# Patient Record
Sex: FEMALE | Race: WHITE | Employment: UNEMPLOYED | URBAN - METROPOLITAN AREA
[De-identification: names, ages, dates, MRNs, and addresses within clinical notes are randomized per-mention and may not be internally consistent; named-entity substitution may affect disease eponyms.]

---

## 2017-06-26 LAB
HBSAG, EXTERNAL: NEGATIVE
HIV, EXTERNAL: NEGATIVE
RUBELLA, EXTERNAL: NORMAL
T. PALLIDUM, EXTERNAL: NEGATIVE
TYPE, ABO & RH, EXTERNAL: NORMAL

## 2017-12-20 LAB — GRBS, EXTERNAL: POSITIVE

## 2018-01-09 ENCOUNTER — HOSPITAL ENCOUNTER (INPATIENT)
Age: 43
LOS: 3 days | Discharge: HOME | End: 2018-01-12
Attending: OBSTETRICS & GYNECOLOGY | Admitting: OBSTETRICS & GYNECOLOGY
Payer: COMMERCIAL

## 2018-01-09 ENCOUNTER — ANESTHESIA EVENT (OUTPATIENT)
Dept: LABOR AND DELIVERY | Age: 43
End: 2018-01-09
Payer: COMMERCIAL

## 2018-01-09 ENCOUNTER — ANESTHESIA (OUTPATIENT)
Dept: LABOR AND DELIVERY | Age: 43
End: 2018-01-09
Payer: COMMERCIAL

## 2018-01-09 PROBLEM — Z34.90 PREGNANCY: Status: ACTIVE | Noted: 2018-01-09

## 2018-01-09 PROBLEM — O46.93 VAGINAL BLEEDING IN PREGNANCY, THIRD TRIMESTER: Status: ACTIVE | Noted: 2018-01-09

## 2018-01-09 LAB
ABO + RH BLD: NORMAL
ALBUMIN SERPL-MCNC: 2.5 G/DL (ref 3.5–5)
ALBUMIN/GLOB SERPL: 0.6 {RATIO} (ref 1.1–2.2)
ALP SERPL-CCNC: 131 U/L (ref 45–117)
ALT SERPL-CCNC: 25 U/L (ref 12–78)
ANION GAP SERPL CALC-SCNC: 7 MMOL/L (ref 5–15)
AST SERPL-CCNC: 16 U/L (ref 15–37)
BILIRUB SERPL-MCNC: 0.4 MG/DL (ref 0.2–1)
BLOOD GROUP ANTIBODIES SERPL: NORMAL
BUN SERPL-MCNC: 9 MG/DL (ref 6–20)
BUN/CREAT SERPL: 24 (ref 12–20)
CALCIUM SERPL-MCNC: 8.8 MG/DL (ref 8.5–10.1)
CHLORIDE SERPL-SCNC: 106 MMOL/L (ref 97–108)
CO2 SERPL-SCNC: 25 MMOL/L (ref 21–32)
CREAT SERPL-MCNC: 0.38 MG/DL (ref 0.55–1.02)
ERYTHROCYTE [DISTWIDTH] IN BLOOD BY AUTOMATED COUNT: 14.1 % (ref 11.5–14.5)
GLOBULIN SER CALC-MCNC: 4 G/DL (ref 2–4)
GLUCOSE SERPL-MCNC: 84 MG/DL (ref 65–100)
HCT VFR BLD AUTO: 39.7 % (ref 35–47)
HGB BLD-MCNC: 13 G/DL (ref 11.5–16)
MCH RBC QN AUTO: 28.5 PG (ref 26–34)
MCHC RBC AUTO-ENTMCNC: 32.7 G/DL (ref 30–36.5)
MCV RBC AUTO: 87.1 FL (ref 80–99)
PLATELET # BLD AUTO: 231 K/UL (ref 150–400)
POTASSIUM SERPL-SCNC: 3.9 MMOL/L (ref 3.5–5.1)
PROT SERPL-MCNC: 6.5 G/DL (ref 6.4–8.2)
RBC # BLD AUTO: 4.56 M/UL (ref 3.8–5.2)
SODIUM SERPL-SCNC: 138 MMOL/L (ref 136–145)
SPECIMEN EXP DATE BLD: NORMAL
WBC # BLD AUTO: 15.1 K/UL (ref 3.6–11)

## 2018-01-09 PROCEDURE — 76060000078 HC EPIDURAL ANESTHESIA: Performed by: OBSTETRICS & GYNECOLOGY

## 2018-01-09 PROCEDURE — 65410000002 HC RM PRIVATE OB

## 2018-01-09 PROCEDURE — 85027 COMPLETE CBC AUTOMATED: CPT | Performed by: OBSTETRICS & GYNECOLOGY

## 2018-01-09 PROCEDURE — 75410000003 HC RECOV DEL/VAG/CSECN EA 0.5 HR: Performed by: OBSTETRICS & GYNECOLOGY

## 2018-01-09 PROCEDURE — 75410000002 HC LABOR FEE PER 1 HR

## 2018-01-09 PROCEDURE — 74011250636 HC RX REV CODE- 250/636: Performed by: OBSTETRICS & GYNECOLOGY

## 2018-01-09 PROCEDURE — 77030007866 HC KT SPN ANES BBMI -B: Performed by: ANESTHESIOLOGY

## 2018-01-09 PROCEDURE — 74011250636 HC RX REV CODE- 250/636

## 2018-01-09 PROCEDURE — 77030003666 HC NDL SPINAL BD -A: Performed by: ANESTHESIOLOGY

## 2018-01-09 PROCEDURE — 0UB00ZZ EXCISION OF RIGHT OVARY, OPEN APPROACH: ICD-10-PCS | Performed by: OBSTETRICS & GYNECOLOGY

## 2018-01-09 PROCEDURE — 77030034849

## 2018-01-09 PROCEDURE — 74011250636 HC RX REV CODE- 250/636: Performed by: ANESTHESIOLOGY

## 2018-01-09 PROCEDURE — 77030027138 HC INCENT SPIROMETER -A

## 2018-01-09 PROCEDURE — 76010000391 HC C SECN FIRST 1 HR: Performed by: OBSTETRICS & GYNECOLOGY

## 2018-01-09 PROCEDURE — 76010000392 HC C SECN EA ADDL 0.5 HR: Performed by: OBSTETRICS & GYNECOLOGY

## 2018-01-09 PROCEDURE — 74011000250 HC RX REV CODE- 250

## 2018-01-09 PROCEDURE — 80053 COMPREHEN METABOLIC PANEL: CPT | Performed by: OBSTETRICS & GYNECOLOGY

## 2018-01-09 PROCEDURE — 36415 COLL VENOUS BLD VENIPUNCTURE: CPT | Performed by: OBSTETRICS & GYNECOLOGY

## 2018-01-09 PROCEDURE — 86900 BLOOD TYPING SEROLOGIC ABO: CPT | Performed by: OBSTETRICS & GYNECOLOGY

## 2018-01-09 PROCEDURE — 77030012890

## 2018-01-09 PROCEDURE — 74011250637 HC RX REV CODE- 250/637: Performed by: OBSTETRICS & GYNECOLOGY

## 2018-01-09 RX ORDER — NALBUPHINE HYDROCHLORIDE 10 MG/ML
2.5 INJECTION, SOLUTION INTRAMUSCULAR; INTRAVENOUS; SUBCUTANEOUS
Status: ACTIVE | OUTPATIENT
Start: 2018-01-09 | End: 2018-01-10

## 2018-01-09 RX ORDER — SODIUM CHLORIDE 0.9 % (FLUSH) 0.9 %
5-10 SYRINGE (ML) INJECTION EVERY 8 HOURS
Status: DISCONTINUED | OUTPATIENT
Start: 2018-01-09 | End: 2018-01-12 | Stop reason: HOSPADM

## 2018-01-09 RX ORDER — ACETAMINOPHEN 325 MG/1
650 TABLET ORAL
Status: DISCONTINUED | OUTPATIENT
Start: 2018-01-09 | End: 2018-01-12 | Stop reason: HOSPADM

## 2018-01-09 RX ORDER — HYDROCORTISONE 1 %
CREAM (GRAM) TOPICAL AS NEEDED
Status: DISCONTINUED | OUTPATIENT
Start: 2018-01-09 | End: 2018-01-12 | Stop reason: HOSPADM

## 2018-01-09 RX ORDER — SODIUM CHLORIDE 0.9 % (FLUSH) 0.9 %
5-10 SYRINGE (ML) INJECTION AS NEEDED
Status: DISCONTINUED | OUTPATIENT
Start: 2018-01-09 | End: 2018-01-12 | Stop reason: HOSPADM

## 2018-01-09 RX ORDER — KETOROLAC TROMETHAMINE 30 MG/ML
30 INJECTION, SOLUTION INTRAMUSCULAR; INTRAVENOUS
Status: DISCONTINUED | OUTPATIENT
Start: 2018-01-09 | End: 2018-01-12 | Stop reason: HOSPADM

## 2018-01-09 RX ORDER — OXYCODONE AND ACETAMINOPHEN 5; 325 MG/1; MG/1
1 TABLET ORAL
Status: DISCONTINUED | OUTPATIENT
Start: 2018-01-09 | End: 2018-01-12 | Stop reason: HOSPADM

## 2018-01-09 RX ORDER — ONDANSETRON 2 MG/ML
4 INJECTION INTRAMUSCULAR; INTRAVENOUS
Status: DISPENSED | OUTPATIENT
Start: 2018-01-09 | End: 2018-01-10

## 2018-01-09 RX ORDER — SODIUM CHLORIDE, SODIUM LACTATE, POTASSIUM CHLORIDE, CALCIUM CHLORIDE 600; 310; 30; 20 MG/100ML; MG/100ML; MG/100ML; MG/100ML
125 INJECTION, SOLUTION INTRAVENOUS CONTINUOUS
Status: DISCONTINUED | OUTPATIENT
Start: 2018-01-09 | End: 2018-01-09 | Stop reason: HOSPADM

## 2018-01-09 RX ORDER — AMMONIA 15 % (W/V)
1 AMPUL (EA) INHALATION AS NEEDED
Status: DISCONTINUED | OUTPATIENT
Start: 2018-01-09 | End: 2018-01-12 | Stop reason: HOSPADM

## 2018-01-09 RX ORDER — MORPHINE SULFATE 10 MG/ML
10 INJECTION, SOLUTION INTRAMUSCULAR; INTRAVENOUS
Status: ACTIVE | OUTPATIENT
Start: 2018-01-09 | End: 2018-01-10

## 2018-01-09 RX ORDER — NALOXONE HYDROCHLORIDE 0.4 MG/ML
0.4 INJECTION, SOLUTION INTRAMUSCULAR; INTRAVENOUS; SUBCUTANEOUS AS NEEDED
Status: DISCONTINUED | OUTPATIENT
Start: 2018-01-09 | End: 2018-01-12 | Stop reason: HOSPADM

## 2018-01-09 RX ORDER — ACETAMINOPHEN 10 MG/ML
1000 INJECTION, SOLUTION INTRAVENOUS ONCE
Status: COMPLETED | OUTPATIENT
Start: 2018-01-09 | End: 2018-01-09

## 2018-01-09 RX ORDER — OXYTOCIN/RINGER'S LACTATE 20/1000 ML
125-1000 PLASTIC BAG, INJECTION (ML) INTRAVENOUS
Status: DISCONTINUED | OUTPATIENT
Start: 2018-01-09 | End: 2018-01-12 | Stop reason: HOSPADM

## 2018-01-09 RX ORDER — MISOPROSTOL 100 UG/1
TABLET ORAL AS NEEDED
Status: DISCONTINUED | OUTPATIENT
Start: 2018-01-09 | End: 2018-01-12 | Stop reason: HOSPADM

## 2018-01-09 RX ORDER — MORPHINE SULFATE 1 MG/ML
INJECTION, SOLUTION EPIDURAL; INTRATHECAL; INTRAVENOUS AS NEEDED
Status: DISCONTINUED | OUTPATIENT
Start: 2018-01-09 | End: 2018-01-09 | Stop reason: HOSPADM

## 2018-01-09 RX ORDER — OXYTOCIN/RINGER'S LACTATE 20/1000 ML
PLASTIC BAG, INJECTION (ML) INTRAVENOUS
Status: DISCONTINUED | OUTPATIENT
Start: 2018-01-09 | End: 2018-01-09 | Stop reason: HOSPADM

## 2018-01-09 RX ORDER — DOCUSATE SODIUM 100 MG/1
100 CAPSULE, LIQUID FILLED ORAL
Status: DISCONTINUED | OUTPATIENT
Start: 2018-01-09 | End: 2018-01-12 | Stop reason: HOSPADM

## 2018-01-09 RX ORDER — SODIUM CHLORIDE, SODIUM LACTATE, POTASSIUM CHLORIDE, CALCIUM CHLORIDE 600; 310; 30; 20 MG/100ML; MG/100ML; MG/100ML; MG/100ML
125 INJECTION, SOLUTION INTRAVENOUS CONTINUOUS
Status: DISCONTINUED | OUTPATIENT
Start: 2018-01-09 | End: 2018-01-12 | Stop reason: HOSPADM

## 2018-01-09 RX ORDER — ONDANSETRON 2 MG/ML
INJECTION INTRAMUSCULAR; INTRAVENOUS AS NEEDED
Status: DISCONTINUED | OUTPATIENT
Start: 2018-01-09 | End: 2018-01-09 | Stop reason: HOSPADM

## 2018-01-09 RX ORDER — SIMETHICONE 80 MG
80 TABLET,CHEWABLE ORAL
Status: DISCONTINUED | OUTPATIENT
Start: 2018-01-09 | End: 2018-01-12 | Stop reason: HOSPADM

## 2018-01-09 RX ORDER — CEFAZOLIN SODIUM/WATER 2 G/20 ML
2 SYRINGE (ML) INTRAVENOUS ONCE
Status: COMPLETED | OUTPATIENT
Start: 2018-01-09 | End: 2018-01-09

## 2018-01-09 RX ORDER — KETOROLAC TROMETHAMINE 30 MG/ML
30 INJECTION, SOLUTION INTRAMUSCULAR; INTRAVENOUS
Status: DISCONTINUED | OUTPATIENT
Start: 2018-01-09 | End: 2018-01-09

## 2018-01-09 RX ORDER — OXYCODONE AND ACETAMINOPHEN 5; 325 MG/1; MG/1
2 TABLET ORAL
Status: DISCONTINUED | OUTPATIENT
Start: 2018-01-09 | End: 2018-01-12 | Stop reason: HOSPADM

## 2018-01-09 RX ORDER — MORPHINE SULFATE 10 MG/ML
6 INJECTION, SOLUTION INTRAMUSCULAR; INTRAVENOUS
Status: ACTIVE | OUTPATIENT
Start: 2018-01-09 | End: 2018-01-10

## 2018-01-09 RX ORDER — EPHEDRINE SULFATE 50 MG/ML
INJECTION, SOLUTION INTRAVENOUS AS NEEDED
Status: DISCONTINUED | OUTPATIENT
Start: 2018-01-09 | End: 2018-01-09 | Stop reason: HOSPADM

## 2018-01-09 RX ORDER — DIPHENHYDRAMINE HCL 25 MG
50 CAPSULE ORAL
Status: DISCONTINUED | OUTPATIENT
Start: 2018-01-09 | End: 2018-01-12 | Stop reason: HOSPADM

## 2018-01-09 RX ORDER — ZOLPIDEM TARTRATE 5 MG/1
5 TABLET ORAL
Status: DISCONTINUED | OUTPATIENT
Start: 2018-01-09 | End: 2018-01-12 | Stop reason: HOSPADM

## 2018-01-09 RX ORDER — ONDANSETRON 4 MG/1
4 TABLET, ORALLY DISINTEGRATING ORAL
Status: DISCONTINUED | OUTPATIENT
Start: 2018-01-09 | End: 2018-01-12 | Stop reason: HOSPADM

## 2018-01-09 RX ORDER — OXYTOCIN/RINGER'S LACTATE 20/1000 ML
125-1000 PLASTIC BAG, INJECTION (ML) INTRAVENOUS AS NEEDED
Status: DISCONTINUED | OUTPATIENT
Start: 2018-01-09 | End: 2018-01-12 | Stop reason: HOSPADM

## 2018-01-09 RX ORDER — IBUPROFEN 400 MG/1
800 TABLET ORAL EVERY 8 HOURS
Status: DISCONTINUED | OUTPATIENT
Start: 2018-01-09 | End: 2018-01-12 | Stop reason: HOSPADM

## 2018-01-09 RX ORDER — BUPIVACAINE HYDROCHLORIDE 7.5 MG/ML
INJECTION, SOLUTION EPIDURAL; RETROBULBAR AS NEEDED
Status: DISCONTINUED | OUTPATIENT
Start: 2018-01-09 | End: 2018-01-09 | Stop reason: HOSPADM

## 2018-01-09 RX ORDER — OXYTOCIN/RINGER'S LACTATE 20/1000 ML
PLASTIC BAG, INJECTION (ML) INTRAVENOUS
Status: COMPLETED
Start: 2018-01-09 | End: 2018-01-09

## 2018-01-09 RX ADMIN — SODIUM CHLORIDE, SODIUM LACTATE, POTASSIUM CHLORIDE, AND CALCIUM CHLORIDE 125 ML/HR: 600; 310; 30; 20 INJECTION, SOLUTION INTRAVENOUS at 09:41

## 2018-01-09 RX ADMIN — EPHEDRINE SULFATE 5 MG: 50 INJECTION, SOLUTION INTRAVENOUS at 14:14

## 2018-01-09 RX ADMIN — Medication: at 14:30

## 2018-01-09 RX ADMIN — MORPHINE SULFATE 200 MCG: 1 INJECTION, SOLUTION EPIDURAL; INTRATHECAL; INTRAVENOUS at 14:06

## 2018-01-09 RX ADMIN — EPHEDRINE SULFATE 5 MG: 50 INJECTION, SOLUTION INTRAVENOUS at 14:21

## 2018-01-09 RX ADMIN — EPHEDRINE SULFATE 5 MG: 50 INJECTION, SOLUTION INTRAVENOUS at 14:17

## 2018-01-09 RX ADMIN — ONDANSETRON 4 MG: 2 INJECTION INTRAMUSCULAR; INTRAVENOUS at 14:14

## 2018-01-09 RX ADMIN — Medication 2 G: at 14:00

## 2018-01-09 RX ADMIN — SODIUM CHLORIDE, SODIUM LACTATE, POTASSIUM CHLORIDE, AND CALCIUM CHLORIDE 125 ML/HR: 600; 310; 30; 20 INJECTION, SOLUTION INTRAVENOUS at 15:21

## 2018-01-09 RX ADMIN — SODIUM CHLORIDE, SODIUM LACTATE, POTASSIUM CHLORIDE, AND CALCIUM CHLORIDE: 600; 310; 30; 20 INJECTION, SOLUTION INTRAVENOUS at 14:21

## 2018-01-09 RX ADMIN — EPHEDRINE SULFATE 25 MG: 50 INJECTION, SOLUTION INTRAVENOUS at 14:13

## 2018-01-09 RX ADMIN — BUPIVACAINE HYDROCHLORIDE 12 MG: 7.5 INJECTION, SOLUTION EPIDURAL; RETROBULBAR at 14:06

## 2018-01-09 RX ADMIN — ACETAMINOPHEN 1000 MG: 10 INJECTION, SOLUTION INTRAVENOUS at 15:55

## 2018-01-09 NOTE — OP NOTES
Operative Note    Name: Halima Morton   Medical Record Number: [id number]   YOB: 1975  Today's Date: 2018      Pre-operative Diagnosis: REPEAT                                               Early labor, with excessive bleeding                                              3cm right ovarian endometrioma    Post-operative Diagnosis: same but delivered    Operation: Low Cervical Transverse Procedure(s):   SECTION  Right ovarian cystectomy    Surgeon(s):  Cintia Zamarripa MD    Anesthesia: Spinal    Prophylactic Antibiotics: Ancef  DVT Prophylaxis: Sequential Compression Devices         Fetal Description: don     Birth Information:   Information for the patient's :  Florentin Gilliam [[id number]]   Delivery of a   Female [1] infant on 2018 at 2:28 PM. Apgars were 9 and 9. Umbilical Cord:     Umbilical Cord Events:     Placenta:  removal with  appearance. Amniotic Fluid Volume:        Amniotic Fluid Description:  Meconium        Umbilical Cord: 3 vessels present    Placenta:  spontaneous    Specimens: none           Complications:  Mild uterine atony, massage, IV pitocin, 600mgm cytotec intra-cavitary    Fluids: lactated ringers, pitocin to IVF post delivery    EBL: 800cc    Procedure Detail:      After proper patient identification and consent, the patient was taken to the operating room, where spinal anesthesia was administered and found to be adequate. Fang catheter had been placed using sterile technique. The patient was prepped and draped in the normal sterile fashion. The abdomen was entered using the Pfannenstiel technique. The peritoneum was entered sharply well superior to the bladder without any apparent injury. The bladder flap was created without difficulty. A low transverse uterine incision was made with the scalpel and extended with blunt finger dissection.  Amniotomy was performed and the fluid was medium amount thin meconium. The babys head was then delivered atraumatically. The nose and mouth were suctioned. The cord was clamped and cut and the baby was handed off to Nursing staff in attendance. Placenta was then removed from the uterus. The uterus was curettaged with a moist lap pad and cleared of all clots and debris. The uterine incision was closed with 1-0 monocryl in running locking fashion with good hemostasis assured. An imbricating 1-0 monocryl suture was placed. The posterior cul-de-sac was irrigated with warm normal saline. Good hemostasis was again reassured. The right ovarian endometrioma was excised down to the level of normal appearing ovarian tissue and irrigated with copious amounts of normal saline. The uterus was returned to the abdomen. The anterior pelvis was irrigated with warm normal saline and good hemostasis was again reassured throughout. The right ovary was hemostatic and wrapped in interseed. The parietal peritoneum was closed with 2-0 Vicryl suture. The pyramidalis muscles were reapproximated with 1 interrupted 2-0 Vicryl suture. The fascia was closed with 1-0 looped PDS in a running fashion. Good hemostasis was assured. The skin was closed with a 4-monocryl subcutaneous closure. The patient tolerated the procedure well. Sponge, lap, and needle counts were correct times three and the patient and baby were taken to recovery/postpartum room in stable condition.     Kenan Mendoza MD  January 9, 2018  3:21 PM

## 2018-01-09 NOTE — PROGRESS NOTES
1545 Bedside report received from MISHA Baez RN. History, allergies, medications, and plan of care reviewed. Patient resting in the bed. Complaints of some lower abdominal pain. See MAR. Will continue to monitor. 1725  TRANSFER - OUT REPORT:  Verbal report given to HALINA Henry RN (name) on Alicia Harris  being transferred to MIU (unit) for routine progression of care       Report consisted of patients Situation, Background, Assessment and   Recommendations(SBAR). Information from the following report(s) SBAR, Kardex, OR Summary, Procedure Summary, Intake/Output, MAR and Recent Results was reviewed with the receiving nurse. Lines:   Peripheral IV 01/09/18 Left Arm (Active)   Site Assessment Clean, dry, & intact 1/9/2018  9:41 AM   Phlebitis Assessment 0 1/9/2018  9:41 AM   Infiltration Assessment 0 1/9/2018  9:41 AM   Dressing Status Clean, dry, & intact 1/9/2018  9:41 AM   Dressing Type Transparent;Tape 1/9/2018  9:41 AM   Hub Color/Line Status Pink 1/9/2018  9:41 AM   Action Taken Blood drawn 1/9/2018  9:41 AM        Opportunity for questions and clarification was provided.       Patient transported with:   Registered Nurse

## 2018-01-09 NOTE — PROGRESS NOTES
TRANSFER - IN REPORT:    Verbal report received from SOHAN Morin RN(name) on Carole Sports  being received from L&D(unit) for routine progression of care      Report consisted of patients Situation, Background, Assessment and   Recommendations(SBAR). Information from the following report(s) SBAR was reviewed with the receiving nurse. Opportunity for questions and clarification was provided. Assessment completed upon patients arrival to unit and care assumed. 1940: Pt does not have toradol ordered, nurse spoke to Dr. Elaine Morris. Per MD you may reorder iv toradol.

## 2018-01-09 NOTE — H&P
EDC:2018  EGA: 38 weeks, 3 days      Primary Provider:  Carlitos Maynard MD      History of Present Illness:   , previous c/s for breech, presents with c/o pink dc this morning and moderate backpain since last night. fetus active, no SROM  PNC with Dr. Key Gordon  rh+, group b streptococcus  +  A1DM, good control, nl surv., nl growth      Patient's Prenatal Care with Doctor of Record Carlitos Maynard MD Notable For -    GBS positive RX in labor   lab screening  High risk pregnancy AMA multigravida, mat21 nl, FS/w ECHO___  Diabetes gestational diet controlled  Ovarian cyst pregnant 3cm, h/o endometrioma '09, f/u pp or at time of c/s  Advanced paternal age  [de-identified] LTCS, breech at term, xx 8-1, desires repeat          Impression & Recommendations:    Problem # 1:  High risk pregnancy AMA multigravida, mat21 nl, FS/w ECHO___ (ICD-V23.82) (KIW41-B01.523)  presents with vaginal spotting/ back pain. r/o labor. considering    monitor,  IV, STBB, glucose check    Problem # 2:  GBS positive RX in labor (JVW-094.06) (AVS45-U10.88)    Problem # 3:  Diabetes gestational diet controlled (EZF-068.63) (QYY93-Z11.410)  23%ile at 36wks, 2800gms    Problem # 4:  Ovarian cyst pregnant 3cm, h/o endometrioma '09, f/u pp or at time of c/s (MTR-080.94) (MMF23-S33.83)          Past Medical History:     Reviewed history from 12/10/2008 and no changes required:        neg.     Past Surgical History:     Reviewed history from 2017 and no changes required:        LTCS, Female  (2009) Funmilayo, breech, excision of right ovarian endometrioma     Family History Summary:      Reviewed history Last on 2017 and no changes required:2018      General Comments - FH:  Family history transferred to [address] [place] [address]        Social History:     Reviewed history from 12/10/2008 and no changes required:                Allergies      Medications Removed from Medication List        [facility] Follow-up Visit     Estimated weeks of        gestation:  38 3/7     Weight:  180.9     Fetal position:  vertex     Cx Dilation:  FT     Cx Effacement: 80%     Cx Station:  -2      Height:   64 inches  Weight:  180.9 pounds      Physical Exam     General           General appearance:  no acute distress    Psych           Mood:  no appearance of anxiety, depression, or agitation    Skin           Inspection:  no rashes, suspicious lesions, or ulcerations    Abdomen           Abdomen:  gravid    Pelvic Exam           EGBUS:  no lesions                  Dilation: : FT                  Effacement:  80%                  Station:  -2                  Presentation:  vertex                  Membranes:  intact            Impression & Recommendations:    Problem # 1:  High risk pregnancy AMA multigravida, mat21 nl, FS/w ECHO___ (ICD-V23.82) (QCI07-B47.523)  presents with vaginal spotting/ back pain. r/o labor.    considering    monitor,  IV, STBB, glucose check    Problem # 2:  GBS positive RX in labor (ANNI-388.73) (RBQ68-G46.45)    Problem # 3:  Diabetes gestational diet controlled (XDG-173.48) (QMU50-W25.410)  23%ile at 36wks, 2800gms    Problem # 4:  Ovarian cyst pregnant 3cm, h/o endometrioma '09, f/u pp or at time of c/s (HVQ-774.70) (QOL65-R57.83)      Medications (at conclusion of this visit)    10/03/2017 FREESTYLE LITE DEVICE (BLOOD GLUCOSE MONITORING SUPPL) use to check blood glucose levels 4 times daily  2017 FREESTYLE LANCETS (LANCETS) use to check blood glucose levels 4 times daily  2017 FREESTYLE LITE TEST IN VITRO STRIP (GLUCOSE BLOOD) use to check blood glucose levels 4 times daily  2017 PNV-DHA 27-0.6-0.4-300 MG ORAL CAPSULE (PRENAT W/O T-YT-[id number]-FA-DHA) 1 po daily          LABORATORY DATA   TEST DATE RESULT   Group B Strep culture 2017 Positive                                   (Group B Strep Culture Result Field)   Blood Type 2017 O (Blood Type Result Field)   Rh 06/26/2017 Positive                                   (Rh Result Field)   Rhogam Inj Given 07/12/2009 *   Tdap Vaccine Given 11/10/2017 Vacc. [address]   Antibody Screen 10/23/2017 Negative   Rubella  [facility] Reference Ranges On or After 3/10/14                  <0.90              Non-immune      0.90 - 0.99     Equivocal      >0.99              Immune    [facility] Reference Ranges  Before 3/10/14           <5                 Non-immune             5 - 9               Equivocal            >9                 Immune  [facility] Reference Ranges       < Or = 0.90       Negative             0.91-1.09          Equivocal            > Or = 1.10       Positive   06/26/2017     10.70     TPA (T Pallidum Antibodies) 10/23/2017 Negative   Serology (RPR) 07/12/2009 *   HBsAg 06/26/2017 Negative   HIV 06/26/2017 Non Reactive   Hemoglobin 10/23/2017 12.6   Hematocrit 10/23/2017 37.9   Platelets 60/79/0377 269 X10E3/UL   TSH 07/12/2009 *   Urine Culture 06/26/2017 Negative   GC DNA Probe 06/26/2017 Negative   Chlamydia DNA 06/26/2017 Negative   PAP 11/10/2017 NIL   Flu Vaccine Given 10/23/2017 Vacc.  [address]   HGBA1C 06/26/2017 5.0   HGB Electro 12/10/2008 Negative   T4, Free 07/12/2009 *   BG Fasting 07/12/2009 *   GTT 1H 50G 09/14/2017 143   GTT 1H 100G 07/12/2009 *   GTT 2H 100G 07/12/2009 *   GTT 3H 100G 07/12/2009 *   Glucose Plasma 07/12/2009 *   CF Accept or Decline 06/26/2017 declined   CF Screen Result     Nuchal Trans 06/26/2017 declined   AFP Only 08/07/2017 *Screen Negative*   Tetra 01/30/2009 AFPNEG   AFP Serum 07/12/2009 *   CVS 06/26/2017 declined   AFP Amniotic 07/12/2009 *   Amnio Karyo 06/26/2017 declined   FISH 07/12/2009 *   GC Culture 07/12/2009 *   Chlamydia Cult 07/12/2009 *   Ureaplasma     Mycoplasma     WBC 06/26/2017 14.8 X10E3/UL   RBC 06/26/2017 4.63 X10E6/UL   MCV 06/26/2017 88   MCH 06/26/2017 29.2   MCHC RBC 06/26/2017 33.0     ULTRASOUND DATA   TEST DATE RESULT   Estimated Fetal Weight 01/02/2018 2831.28448704^2832 g&grams                                     Weight % 01/02/2018 25^25% %&percent                                                GERALD 01/02/2018 9.16^9.2 cm&centimeters                    BPP 01/02/2018 8^8 [n/a]&Not applicable   Cervical Length (mm)             Electronically signed by Samy Allen MD on 01/09/2018 at 9:40 AM    ________________________________________________________________________

## 2018-01-09 NOTE — ANESTHESIA PREPROCEDURE EVALUATION
Anesthetic History   No history of anesthetic complications            Review of Systems / Medical History  Patient summary reviewed, nursing notes reviewed and pertinent labs reviewed    Pulmonary  Within defined limits                 Neuro/Psych   Within defined limits           Cardiovascular  Within defined limits                     GI/Hepatic/Renal  Within defined limits              Endo/Other    Diabetes    Obesity     Other Findings              Physical Exam    Airway  Mallampati: II  TM Distance: > 6 cm  Neck ROM: normal range of motion   Mouth opening: Normal     Cardiovascular  Regular rate and rhythm,  S1 and S2 normal,  no murmur, click, rub, or gallop             Dental    Dentition: Upper dentition intact and Lower dentition intact     Pulmonary  Breath sounds clear to auscultation               Abdominal  GI exam deferred       Other Findings            Anesthetic Plan    ASA: 2  Anesthesia type: spinal      Post-op pain plan if not by surgeon: intrathecal opiates      Anesthetic plan and risks discussed with: Patient

## 2018-01-09 NOTE — IP AVS SNAPSHOT
[address] 
[phone] Patient: Gerson Carlson MRN: [id number] MMO:6[birth year] A check black indicates which time of day the medication should be taken. My Medications START taking these medications Instructions Each Dose to Equal  
 Morning Noon Evening Bedtime  
 docusate sodium 100 mg capsule Commonly known as:  Eleonore Alfredo Your last dose was: Your next dose is: Take 1 Cap by mouth two (2) times daily as needed for Constipation. 100 mg  
    
   
   
   
  
 ibuprofen 600 mg tablet Commonly known as:  MOTRIN Your last dose was: Your next dose is: Take 1 Tab by mouth every six (6) hours as needed for Pain. 600 mg  
    
   
   
   
  
 oxyCODONE-acetaminophen 5-325 mg per tablet Commonly known as:  PERCOCET Your last dose was: Your next dose is: Take 1 Tab by mouth every four (4) hours as needed for Pain. Max Daily Amount: 6 Tabs. Indications: Pain 1 Tab CONTINUE taking these medications Instructions Each Dose to Equal  
 Morning Noon Evening Bedtime PRENATAL MULTI PO Your last dose was: Your next dose is: Take  by mouth. Where to Get Your Medications Information on where to get these meds will be given to you by the nurse or doctor. ! Ask your nurse or doctor about these medications  
  docusate sodium 100 mg capsule  
 ibuprofen 600 mg tablet  
 oxyCODONE-acetaminophen 5-325 mg per tablet

## 2018-01-09 NOTE — PROGRESS NOTES
4140 Patient arrived in L&d with complaints of light vaginal bleeding that started around 0600 this morning. Positive fetal movement, no leaking of fluid    0922 Dr Kaitlin Soria at bedside. SVE closed and MD noticed small amount of blood on glove. MD considering delivering patient via C/S today    0950 Dr Kaitlin Soria reviewed fhr strip and aware of minimal variability    1116 Dr Kaitlin Soria at nurses station and reviewed fhr strip    1140 Dr Kaitlin Soria at bedside and reviewed fhr strip. SVE 1cm. MD wants to monitor patient little longer and recheck cervix    1324 Dr Kaitlin Soria at bedside. SVE 1cm no change. MD called C/S, patient still having vaginal bleeding present on glove    1545 Bedside and Verbal shift change report given to SOHAN Vargas RN (oncoming nurse) by MISHA Campbell RN (offgoing nurse). Report included the following information SBAR, OR Summary, Procedure Summary, Intake/Output, MAR and Med Rec Status.

## 2018-01-09 NOTE — IP AVS SNAPSHOT
Summary of Care Report The Summary of Care report has been created to help improve care coordination. Users with access to [employer] or [address] (Web-based application) may access additional patient information including the Discharge Summary. If you are not currently a [address] user and need more information, please call the number listed below in the [place] [address] section and ask to be connected with Medical Records. Facility Information Name Address Phone [address] [phone] Patient Information Patient Name Sex  Nenita Harris ([id number]) Female 1975 Discharge Information Admitting Provider Service Area Unit  
 Susan Vazquez MD /  [facility] 3w Mother Infant / [phone] Discharge Provider Discharge Date/Time Discharge Disposition Destination (none) 2018 Morning (Pending) AHR (none) Patient Language Language ENGLISH [13] Hospital Problems as of 2018  Reviewed: 2018  3:21 PM by Susan Vazquez MD  
  
  
  
 Class Noted - Resolved Last Modified POA Active Problems Vaginal bleeding in pregnancy, third trimester  2018 - Present 2018 by Susan Vazquez MD Unknown Entered by Susan Vazquez MD  
  Pregnancy  2018 - Present 2018 by Susan Vazquez MD Unknown Entered by Susan Vazquez MD  
  Single delivery by   2018 - Present 2018 by Susan Vazquez MD Unknown Entered by Susan Vazquez MD  
  
Non-Hospital Problems as of 2018  Reviewed: 2018  3:21 PM by Susan Vazquez MD  
 None You are allergic to the following No active allergies Current Discharge Medication List  
  
START taking these medications Dose & Instructions Dispensing Information Comments docusate sodium 100 mg capsule Commonly known as:  Juancarlos Erm Dose:  100 mg Take 1 Cap by mouth two (2) times daily as needed for Constipation. Quantity:  60 Cap Refills:  1  
   
 ibuprofen 600 mg tablet Commonly known as:  MOTRIN Dose:  600 mg Take 1 Tab by mouth every six (6) hours as needed for Pain. Quantity:  40 Tab Refills:  0  
   
 oxyCODONE-acetaminophen 5-325 mg per tablet Commonly known as:  PERCOCET Dose:  1 Tab Take 1 Tab by mouth every four (4) hours as needed for Pain. Max Daily Amount: 6 Tabs. Indications: Pain Quantity:  40 Tab Refills:  0 CONTINUE these medications which have NOT CHANGED Dose & Instructions Dispensing Information Comments PRENATAL MULTI PO Take  by mouth. Refills:  0 Surgery Information ID Date/Time Status Primary Surgeon All Procedures Location [id number] 2018 Posted Susana Tinsley MD  SECTION [facility] L&D OR    
  SECTION:  [facility]  Follow-up Information Follow up With Details Comments Contact Info [facility] Call As needed with breastfeeding questions [address] [facility] [place] [facility]", [address] 
[phone] Susana Tinsley MD   [address] [address] [phone] Discharge Instructions  Section: What to Expect at [facility] Your Recovery A  section, or , is surgery to deliver your baby through a cut, called an incision, that the doctor makes in your lower belly and uterus. You may have some pain in your lower belly and need pain medicine for 1 to 2 weeks. You can expect some vaginal bleeding for several weeks. You will probably need about 6 weeks to fully recover. It is important to take it easy while the incision is healing. Avoid heavy lifting, strenuous activities, or exercises that strain the belly muscles while you are recovering.  Ask a family member or friend for help with housework, cooking, and shopping. This care sheet gives you a general idea about how long it will take for you to recover. But each person recovers at a different pace. Follow the steps below to get better as quickly as possible. How can you care for yourself at home? Activity ? · Rest when you feel tired. Getting enough sleep will help you recover. ? · Try to walk each day. Start by walking a little more than you did the day before. Bit by bit, increase the amount you walk. Walking boosts blood flow and helps prevent pneumonia, constipation, and blood clots. ? · Avoid strenuous activities, such as bicycle riding, jogging, weightlifting, and aerobic exercise, for 6 weeks or until your doctor says it is okay. ? · Until your doctor says it is okay, do not lift anything heavier than your baby. ? · Do not do sit-ups or other exercises that strain the belly muscles for 6 weeks or until your doctor says it is okay. ? · Hold a pillow over your incision when you cough or take deep breaths. This will support your belly and decrease your pain. ? · You may shower as usual. Pat the incision dry when you are done. ? · You will have some vaginal bleeding. Wear sanitary pads. Do not douche or use tampons until your doctor says it is okay. ? · Ask your doctor when you can drive again. ? · You will probably need to take at least 6 weeks off work. It depends on the type of work you do and how you feel. ? · Ask your doctor when it is okay for you to have sex. Diet ? · You can eat your normal diet. If your stomach is upset, try bland, low-fat foods like plain rice, broiled chicken, toast, and yogurt. ? · Drink plenty of fluids (unless your doctor tells you not to). ? · You may notice that your bowel movements are not regular right after your surgery. This is common. Try to avoid constipation and straining with bowel movements. You may want to take a fiber supplement every day.  If you have not had a bowel movement after a couple of days, ask your doctor about taking a mild laxative. ? · If you are breastfeeding, do not drink any alcohol. Medicines ? · Your doctor will tell you if and when you can restart your medicines. He or she will also give you instructions about taking any new medicines. ? · If you take blood thinners, such as warfarin (Coumadin), clopidogrel (Plavix), or aspirin, be sure to talk to your doctor. He or she will tell you if and when to start taking those medicines again. Make sure that you understand exactly what your doctor wants you to do. ? · Take pain medicines exactly as directed. ¨ If the doctor gave you a prescription medicine for pain, take it as prescribed. ¨ If you are not taking a prescription pain medicine, ask your doctor if you can take an over-the-counter medicine. ? · If you think your pain medicine is making you sick to your stomach: 
¨ Take your medicine after meals (unless your doctor has told you not to). ¨ Ask your doctor for a different pain medicine. ? · If your doctor prescribed antibiotics, take them as directed. Do not stop taking them just because you feel better. You need to take the full course of antibiotics. Incision care ? · If you have strips of tape on the incision, leave the tape on for a week or until it falls off.  
? · Wash the area daily with warm, soapy water, and pat it dry. Don't use hydrogen peroxide or alcohol, which can slow healing. You may cover the area with a gauze bandage if it weeps or rubs against clothing. Change the bandage every day. ? · Keep the area clean and dry. Other instructions ? · If you breastfeed your baby, you may be more comfortable while you are healing if you place the baby so that he or she is not resting on your belly. Try tucking your baby under your arm, with his or her body along the side you will be feeding on.  Support your baby's upper body with your arm. With that hand you can control your baby's head to bring his or her mouth to your breast. This is sometimes called the football hold. Follow-up care is a key part of your treatment and safety. Be sure to make and go to all appointments, and call your doctor if you are having problems. It's also a good idea to know your test results and keep a list of the medicines you take. When should you call for help? Call 911 anytime you think you may need emergency care. For example, call if: 
? · You passed out (lost consciousness). ? · You have chest pain, are short of breath, or cough up blood. ?Call your doctor now or seek immediate medical care if: 
? · You have pain that does not get better after you take pain medicine. ? · You have severe vaginal bleeding. ? · You are dizzy or lightheaded, or you feel like you may faint. ? · You have new or worse pain in your belly or pelvis. ? · You have loose stitches, or your incision comes open. ? · You have symptoms of infection, such as: 
¨ Increased pain, swelling, warmth, or redness. ¨ Red streaks leading from the incision. ¨ Pus draining from the incision. ¨ A fever. ? · You have symptoms of a blood clot in your leg (called a deep vein thrombosis), such as: 
¨ Pain in your calf, back of the knee, thigh, or groin. ¨ Redness and swelling in your leg or groin. ? Watch closely for changes in your health, and be sure to contact your doctor if: 
? · You do not get better as expected. Where can you learn more? Go to http://tani-patt.info/. Enter M806 in the search box to learn more about \" Section: What to Expect at Home. \" Current as of: 2017 Content Version: 11.4 © 3209-7053 Healthwise, Incorporated. Care instructions adapted under license by [employer] (which disclaims liability or warranty for this information).  If you have questions about a medical condition or this instruction, always ask your healthcare professional. [address] [place] [address] any warranty or liability for your use of this information. Chart Review Routing History Recipient Method Report Sent By Corin Bhardwaj MD  
Phone: [phone] In Basket IP Auto Routed Notes Liban Mustafa MD [[id number]] 1/12/2018  8:11 AM 01/12/2018 Nathen Bhardwaj MD  
Phone: [phone] In Basket IP Auto Routed Notes Liban Mustafa MD [[id number]] 1/12/2018  8:12 AM 01/12/2018

## 2018-01-09 NOTE — IP AVS SNAPSHOT
[address] [facility] [address] [place] [address] 
[phone] Patient: Halima Morton MRN: [id number] About your hospitalization You were admitted on:  2018 You last received care in the:  [address] [facility] You were discharged on:  2018 Why you were hospitalized Your primary diagnosis was:  Not on File Your diagnoses also included:  Vaginal Bleeding In Pregnancy, Third Trimester, Pregnancy, Single Delivery By  Follow-up Information Follow up With Details Comments Contact Info [facility] Call As needed with breastfeeding questions [address], [address] 
[phone] Cintia Zamarripa MD   [address] [address] [phone] Your Scheduled Appointments 2018  9:00 AM EST  
L&D PAT with [facility] L&D PAT  
[facility] LD PAT SHADOW ([address]) [address]  
[phone] Discharge Orders None A check black indicates which time of day the medication should be taken. My Medications START taking these medications Instructions Each Dose to Equal  
 Morning Noon Evening Bedtime  
 docusate sodium 100 mg capsule Commonly known as:  Annita Gowers Your last dose was: Your next dose is: Take 1 Cap by mouth two (2) times daily as needed for Constipation. 100 mg  
    
   
   
   
  
 ibuprofen 600 mg tablet Commonly known as:  MOTRIN Your last dose was: Your next dose is: Take 1 Tab by mouth every six (6) hours as needed for Pain. 600 mg  
    
   
   
   
  
 oxyCODONE-acetaminophen 5-325 mg per tablet Commonly known as:  PERCOCET Your last dose was: Your next dose is: Take 1 Tab by mouth every four (4) hours as needed for Pain.  Max Daily Amount: 6 Tabs. Indications: Pain 1 Tab CONTINUE taking these medications Instructions Each Dose to Equal  
 Morning Noon Evening Bedtime PRENATAL MULTI PO Your last dose was: Your next dose is: Take  by mouth. Where to Get Your Medications Information on where to get these meds will be given to you by the nurse or doctor. ! Ask your nurse or doctor about these medications  
  docusate sodium 100 mg capsule  
 ibuprofen 600 mg tablet  
 oxyCODONE-acetaminophen 5-325 mg per tablet Discharge Instructions  Section: What to Expect at [facility] Your Recovery A  section, or , is surgery to deliver your baby through a cut, called an incision, that the doctor makes in your lower belly and uterus. You may have some pain in your lower belly and need pain medicine for 1 to 2 weeks. You can expect some vaginal bleeding for several weeks. You will probably need about 6 weeks to fully recover. It is important to take it easy while the incision is healing. Avoid heavy lifting, strenuous activities, or exercises that strain the belly muscles while you are recovering. Ask a family member or friend for help with housework, cooking, and shopping. This care sheet gives you a general idea about how long it will take for you to recover. But each person recovers at a different pace. Follow the steps below to get better as quickly as possible. How can you care for yourself at home? Activity ? · Rest when you feel tired. Getting enough sleep will help you recover. ? · Try to walk each day. Start by walking a little more than you did the day before. Bit by bit, increase the amount you walk. Walking boosts blood flow and helps prevent pneumonia, constipation, and blood clots.   
? · Avoid strenuous activities, such as bicycle riding, jogging, weightlifting, and aerobic exercise, for 6 weeks or until your doctor says it is okay. ? · Until your doctor says it is okay, do not lift anything heavier than your baby. ? · Do not do sit-ups or other exercises that strain the belly muscles for 6 weeks or until your doctor says it is okay. ? · Hold a pillow over your incision when you cough or take deep breaths. This will support your belly and decrease your pain. ? · You may shower as usual. Pat the incision dry when you are done. ? · You will have some vaginal bleeding. Wear sanitary pads. Do not douche or use tampons until your doctor says it is okay. ? · Ask your doctor when you can drive again. ? · You will probably need to take at least 6 weeks off work. It depends on the type of work you do and how you feel. ? · Ask your doctor when it is okay for you to have sex. Diet ? · You can eat your normal diet. If your stomach is upset, try bland, low-fat foods like plain rice, broiled chicken, toast, and yogurt. ? · Drink plenty of fluids (unless your doctor tells you not to). ? · You may notice that your bowel movements are not regular right after your surgery. This is common. Try to avoid constipation and straining with bowel movements. You may want to take a fiber supplement every day. If you have not had a bowel movement after a couple of days, ask your doctor about taking a mild laxative. ? · If you are breastfeeding, do not drink any alcohol. Medicines ? · Your doctor will tell you if and when you can restart your medicines. He or she will also give you instructions about taking any new medicines. ? · If you take blood thinners, such as warfarin (Coumadin), clopidogrel (Plavix), or aspirin, be sure to talk to your doctor. He or she will tell you if and when to start taking those medicines again. Make sure that you understand exactly what your doctor wants you to do. ? · Take pain medicines exactly as directed. ¨ If the doctor gave you a prescription medicine for pain, take it as prescribed. ¨ If you are not taking a prescription pain medicine, ask your doctor if you can take an over-the-counter medicine. ? · If you think your pain medicine is making you sick to your stomach: 
¨ Take your medicine after meals (unless your doctor has told you not to). ¨ Ask your doctor for a different pain medicine. ? · If your doctor prescribed antibiotics, take them as directed. Do not stop taking them just because you feel better. You need to take the full course of antibiotics. Incision care ? · If you have strips of tape on the incision, leave the tape on for a week or until it falls off.  
? · Wash the area daily with warm, soapy water, and pat it dry. Don't use hydrogen peroxide or alcohol, which can slow healing. You may cover the area with a gauze bandage if it weeps or rubs against clothing. Change the bandage every day. ? · Keep the area clean and dry. Other instructions ? · If you breastfeed your baby, you may be more comfortable while you are healing if you place the baby so that he or she is not resting on your belly. Try tucking your baby under your arm, with his or her body along the side you will be feeding on. Support your baby's upper body with your arm. With that hand you can control your baby's head to bring his or her mouth to your breast. This is sometimes called the football hold. Follow-up care is a key part of your treatment and safety. Be sure to make and go to all appointments, and call your doctor if you are having problems. It's also a good idea to know your test results and keep a list of the medicines you take. When should you call for help? Call 911 anytime you think you may need emergency care. For example, call if: 
? · You passed out (lost consciousness). ? · You have chest pain, are short of breath, or cough up blood. ?Call your doctor now or seek immediate medical care if: ? · You have pain that does not get better after you take pain medicine. ? · You have severe vaginal bleeding. ? · You are dizzy or lightheaded, or you feel like you may faint. ? · You have new or worse pain in your belly or pelvis. ? · You have loose stitches, or your incision comes open. ? · You have symptoms of infection, such as: 
¨ Increased pain, swelling, warmth, or redness. ¨ Red streaks leading from the incision. ¨ Pus draining from the incision. ¨ A fever. ? · You have symptoms of a blood clot in your leg (called a deep vein thrombosis), such as: 
¨ Pain in your calf, back of the knee, thigh, or groin. ¨ Redness and swelling in your leg or groin. ? Watch closely for changes in your health, and be sure to contact your doctor if: 
? · You do not get better as expected. Where can you learn more? Go to http://tani-patt.info/. Enter M806 in the search box to learn more about \" Section: What to Expect at Home. \" Current as of: 2017 Content Version: 11.4 © 8746-5357 [employer]. Care instructions adapted under license by [employer] (which disclaims liability or warranty for this information). If you have questions about a medical condition or this instruction, always ask your healthcare professional. [address] any warranty or liability for your use of this information. [employer] Announcement We are excited to announce that we are making your provider's discharge notes available to you in [employer]. You will see these notes when they are completed and signed by the physician that discharged you from your recent hospital stay. If you have any questions or concerns about any information you see in [employer], please call the Health Information Department where you were seen or reach out to your Primary Care Provider for more information about your plan of care. Introducing [facility]! Ekaterina Lisa introduces Vertigo patient portal. Now you can access parts of your medical record, email your doctor's office, and request medication refills online. 1. In your internet browser, go to [email or web address] 2. Click on the First Time User? Click Here link in the Sign In box. You will see the New Member Sign Up page. 3. Enter your Vertigo Access Code exactly as it appears below. You will not need to use this code after youve completed the sign-up process. If you do not sign up before the expiration date, you must request a new code. · Vertigo Access Code: [email or web address] Expires: 4/12/2018  2:47 PM 
 
4. Enter the last four digits of your Social Security Number (xxxx) and Date of Birth (mm/dd/yyyy) as indicated and click Submit. You will be taken to the next sign-up page. 5. Create a Vertigo ID. This will be your Vertigo login ID and cannot be changed, so think of one that is secure and easy to remember. 6. Create a Vertigo password. You can change your password at any time. 7. Enter your Password Reset Question and Answer. This can be used at a later time if you forget your password. 8. Enter your e-mail address. You will receive e-mail notification when new information is available in [address] 9. Click Sign Up. You can now view and download portions of your medical record. 10. Click the Download Summary menu link to download a portable copy of your medical information. If you have questions, please visit the Frequently Asked Questions section of the Vertigo website. Remember, Vertigo is NOT to be used for urgent needs. For medical emergencies, dial 911. Now available from your iPhone and Android! Providers Seen During Your Hospitalization Provider Specialty Primary office phone Isidro Graham MD Obstetrics & Gynecology [phone] Your Primary Care Physician (PCP) Primary Care Physician Office Phone Office Fax Gi Hogan [phone] You are allergic to the following No active allergies Recent Documentation Height Weight Breastfeeding? BMI OB Status Smoking Status 1.626 m 81.6 kg Unknown 30.9 kg/m2 Recent pregnancy Never Smoker Emergency Contacts Name Discharge Info Relation Home Work Mobile [address] CAREGIVER [3] Spouse [3] [phone] Patient Belongings The following personal items are in your possession at time of discharge: 
  Dental Appliances: None  Visual Aid: None      Home Medications: None   Jewelry: With patient  Clothing: With patient    Other Valuables: None Please provide this summary of care documentation to your next provider. Signatures-by signing, you are acknowledging that this After Visit Summary has been reviewed with you and you have received a copy. Patient Signature:  ____________________________________________________________ Date:  ____________________________________________________________  
  
[facility] Provider Signature:  ____________________________________________________________ Date:  ____________________________________________________________

## 2018-01-09 NOTE — PROGRESS NOTES
Labor Progress Note  Patient seen, fetal heart rate and contraction pattern evaluated, patient examined. Patient Vitals for the past 1 hrs:   BP Temp Pulse Resp   01/09/18 1252 125/64 97.8 °F (36.6 °C) 78 16       Physical Exam:  Cervical Exam:  70/1/-1/vtx/post  Membranes: intact  Uterine Activity: q3-5min  Fetal Heart Rate: Reactive    Assessment/Plan:  Active Problems:    Vaginal bleeding in pregnancy, third trimester (1/9/2018)      Pregnancy (1/9/2018)      Reassuring fetal status, cont. mild vaginal bleeding of questionable source with signs of early labor.  We'll proceed with delivery , pt desires repeat

## 2018-01-09 NOTE — ANESTHESIA POSTPROCEDURE EVALUATION
Post-Anesthesia Evaluation and Assessment    Patient: Lise Jara MRN: [id number]  SSN: [id number]    YOB: 1975  Age: 44 y.o. Sex: female       Cardiovascular Function/Vital Signs  Visit Vitals    /79    Pulse 87    Temp 36.4 °C (97.6 °F)    Resp 13    Ht 5' 4\" (1.626 m)    Wt 81.6 kg (180 lb)    SpO2 98%    Breastfeeding No    BMI 30.9 kg/m2       Patient is status post spinal anesthesia for Procedure(s):   SECTION. Nausea/Vomiting: None    Postoperative hydration reviewed and adequate. Pain:  Pain Scale 1: Labor Algorithm/Pain Intensity (18 1100)   Managed    Neurological Status: At baseline    Mental Status and Level of Consciousness: Arousable    Pulmonary Status:   O2 Device: Room air (18 1520)   Adequate oxygenation and airway patent    Complications related to anesthesia: None    Post-anesthesia assessment completed.  No concerns    Signed By: Alondra Pozo MD     2018

## 2018-01-10 LAB
BASOPHILS # BLD: 0 K/UL (ref 0–0.1)
BASOPHILS NFR BLD: 0 % (ref 0–1)
EOSINOPHIL # BLD: 0.1 K/UL (ref 0–0.4)
EOSINOPHIL NFR BLD: 1 % (ref 0–7)
ERYTHROCYTE [DISTWIDTH] IN BLOOD BY AUTOMATED COUNT: 14.1 % (ref 11.5–14.5)
HCT VFR BLD AUTO: 35.6 % (ref 35–47)
HGB BLD-MCNC: 11.5 G/DL (ref 11.5–16)
LYMPHOCYTES # BLD: 2.1 K/UL (ref 0.8–3.5)
LYMPHOCYTES NFR BLD: 12 % (ref 12–49)
MCH RBC QN AUTO: 28 PG (ref 26–34)
MCHC RBC AUTO-ENTMCNC: 32.3 G/DL (ref 30–36.5)
MCV RBC AUTO: 86.8 FL (ref 80–99)
MONOCYTES # BLD: 1.3 K/UL (ref 0–1)
MONOCYTES NFR BLD: 8 % (ref 5–13)
NEUTS SEG # BLD: 14.1 K/UL (ref 1.8–8)
NEUTS SEG NFR BLD: 79 % (ref 32–75)
PLATELET # BLD AUTO: 221 K/UL (ref 150–400)
RBC # BLD AUTO: 4.1 M/UL (ref 3.8–5.2)
WBC # BLD AUTO: 17.7 K/UL (ref 3.6–11)

## 2018-01-10 PROCEDURE — 74011250637 HC RX REV CODE- 250/637: Performed by: OBSTETRICS & GYNECOLOGY

## 2018-01-10 PROCEDURE — 65410000002 HC RM PRIVATE OB

## 2018-01-10 PROCEDURE — 36415 COLL VENOUS BLD VENIPUNCTURE: CPT | Performed by: OBSTETRICS & GYNECOLOGY

## 2018-01-10 PROCEDURE — 85025 COMPLETE CBC W/AUTO DIFF WBC: CPT | Performed by: OBSTETRICS & GYNECOLOGY

## 2018-01-10 RX ADMIN — ACETAMINOPHEN 650 MG: 325 TABLET, FILM COATED ORAL at 18:39

## 2018-01-10 RX ADMIN — ACETAMINOPHEN 650 MG: 325 TABLET, FILM COATED ORAL at 10:25

## 2018-01-10 NOTE — PROGRESS NOTES
Post-Operative  Day 1    Alexandria Mcgregor     Assessment: Post-Op day 1, stable    Plan:   1. Routine post-operative care       Information for the patient's :  Audrey Hunt [[id number]]   , Low Transverse   Patient doing well without significant complaint. Nausea and vomiting resolved, tolerating liquids, no flatus, caro in place. Vitals:  Visit Vitals    /63 (BP 1 Location: Right arm, BP Patient Position: At rest)    Pulse 94    Temp 98.6 °F (37 °C)    Resp 16    Ht 5' 4\" (1.626 m)    Wt 81.6 kg (180 lb)    SpO2 98%    Breastfeeding No    BMI 30.9 kg/m2     Temp (24hrs), Av.4 °F (36.9 °C), Min:97.6 °F (36.4 °C), Max:99 °F (37.2 °C)      Last 24hr Input/Output:    Intake/Output Summary (Last 24 hours) at 01/10/18 1158  Last data filed at 01/10/18 0200   Gross per 24 hour   Intake             2150 ml   Output             3300 ml   Net            -1150 ml          Exam:        Patient without distress. Lungs clear. Abdomen, bowel sounds present, soft, expected tenderness, fundus firm Wound dressing intact     Perineum normal lochia noted               Lower extremities are negative for swelling, cords or tenderness.     Labs:   Lab Results   Component Value Date/Time    WBC 17.7 01/10/2018 05:48 AM    WBC 15.1 2018 09:42 AM    WBC 15.4 2009 06:00 AM    WBC 10.7 2009 11:00 AM    HGB 11.5 01/10/2018 05:48 AM    HGB 13.0 2018 09:42 AM    HGB 11.2 2009 06:00 AM    HGB 13.1 2009 11:00 AM    HCT 35.6 01/10/2018 05:48 AM    HCT 39.7 2018 09:42 AM    HCT 34.3 2009 06:00 AM    HCT 39.7 2009 11:00 AM    PLATELET 852  05:48 AM    PLATELET 083  09:42 AM    PLATELET 201  06:00 AM    PLATELET 420  11:00 AM       Recent Results (from the past 24 hour(s))   CBC WITH AUTOMATED DIFF    Collection Time: 01/10/18  5:48 AM   Result Value Ref Range    WBC 17.7 (H) 3.6 - 11.0 K/uL RBC 4.10 3.80 - 5.20 M/uL    HGB 11.5 11.5 - 16.0 g/dL    HCT 35.6 35.0 - 47.0 %    MCV 86.8 80.0 - 99.0 FL    MCH 28.0 26.0 - 34.0 PG    MCHC 32.3 30.0 - 36.5 g/dL    RDW 14.1 11.5 - 14.5 %    PLATELET 425 003 - 343 K/uL    NEUTROPHILS 79 (H) 32 - 75 %    LYMPHOCYTES 12 12 - 49 %    MONOCYTES 8 5 - 13 %    EOSINOPHILS 1 0 - 7 %    BASOPHILS 0 0 - 1 %    ABS. NEUTROPHILS 14.1 (H) 1.8 - 8.0 K/UL    ABS. LYMPHOCYTES 2.1 0.8 - 3.5 K/UL    ABS. MONOCYTES 1.3 (H) 0.0 - 1.0 K/UL    ABS. EOSINOPHILS 0.1 0.0 - 0.4 K/UL    ABS.  BASOPHILS 0.0 0.0 - 0.1 K/UL

## 2018-01-10 NOTE — LACTATION NOTE
This note was copied from a baby's chart. Initial Lactation Consultation: Infant born yesterday to a  mom via C/S at 45 3/7 weeks gestation. Mom noticed breast changes during the pregnancy. She states she nursed her first child, but didn't feel like she had a good supply. I observed the infant at the breast, nursing in the football hold. Infant latched deeply, with rhythmic sucking and occasional swallowing. Demonstrated to mom how to perform breast massage and manual expression. I discussed the process of lactogenesis with mom and that the production of milk is dependent upon the stimulation provided to the breast and removal of the existing colostrum. Mom verbalizes understanding. I shared with mom that the recommendation is that she not give the infant formula or pacifiers until 1weeks of age. Discussed the impact of bottles/formula on milk supply. Skin to skin and rooming in discussed with mom. The benefits include infants temperature regulation, decrease stress in mom and baby, increase in maternal milk production and allowing mom to see early feeding cues. Feeding Plan: Mother will keep baby skin to skin as often as possible, feed on demand, 8-12x/day , respond to feeding cues, obtain latch, listen for audible swallowing, be aware of signs of oxytocin release/ cramping,thirst,sleepiness while breastfeeding, offer both breasts,and will not limit feedings. Mother agrees to utilize breast massage while nursing to facilitate lactogenesis. 200 Mom is requesting a bottle, stating that infant is not consolable. (Infant is currently sleeping in mother's lap). Mom is concerned that infant is not getting enough from her. I was able to express colostrum and showed it to mom. Infant latched deeply and nursed well on both breasts while I was in the room. Demonstrated to mom how to perform breast massage while infant is nursing. Occasional swallowing of infant could be heard.    Father of baby asks about pumping mom's breasts so that they can see how much milk is there. I explained to them that the infant is better at removing mom's milk/colostrum than by using a pump and that pumping will not provide an accurate assessment of mom's colostrum availability at this time.

## 2018-01-10 NOTE — ROUTINE PROCESS
Received OB SBAR Report from HALINA Jean RN    2000 pt nauseous, vomited small amount of clear fluid, will not remove caro at 6hr black (2030), will reevaluate in two hours

## 2018-01-10 NOTE — PROGRESS NOTES
Anesthesia Post Operative Day 1      Patient is s/p spinal / epidural DuraMorph for Cesearean Section. The patient relates mild pain, none itching and mild  nausea. All symptoms were treated with protocol meds with good results. Patient is up and ambulating without complaints. Plan: Continue Duramorph protocol as needed.

## 2018-01-11 PROCEDURE — 65410000002 HC RM PRIVATE OB

## 2018-01-11 PROCEDURE — 74011250637 HC RX REV CODE- 250/637: Performed by: OBSTETRICS & GYNECOLOGY

## 2018-01-11 RX ADMIN — IBUPROFEN 800 MG: 400 TABLET, FILM COATED ORAL at 17:37

## 2018-01-11 RX ADMIN — ACETAMINOPHEN 650 MG: 325 TABLET, FILM COATED ORAL at 00:08

## 2018-01-11 RX ADMIN — ACETAMINOPHEN 650 MG: 325 TABLET, FILM COATED ORAL at 05:49

## 2018-01-11 RX ADMIN — IBUPROFEN 800 MG: 400 TABLET, FILM COATED ORAL at 09:37

## 2018-01-11 NOTE — PROGRESS NOTES
Post-Operative  Day 2    Alexandria Mcgregor     Assessment: Post-Op day 2, doing well    Plan:   1. Routine post-operative care    Information for the patient's :  Geovani Oreilly [[id number]]   , Low Transverse   Patient doing well without significant complaint. Nausea and vomiting resolved, tolerating liquids, passing flatus, voiding and ambulating without difficulty. Vitals:  Visit Vitals    /72 (BP 1 Location: Right arm, BP Patient Position: At rest)    Pulse 91    Temp 98.2 °F (36.8 °C)    Resp 16    Ht 5' 4\" (1.626 m)    Wt 81.6 kg (180 lb)    SpO2 98%    Breastfeeding No    BMI 30.9 kg/m2     Temp (24hrs), Av.3 °F (36.8 °C), Min:98 °F (36.7 °C), Max:98.7 °F (37.1 °C)        Exam:        Patient without distress. Abdomen, bowel sounds present, soft, expected tenderness, fundus firm                Wound incision clean, dry and intact               Lower extremities are negative for swelling, cords or tenderness. Labs:   Lab Results   Component Value Date/Time    WBC 17.7 01/10/2018 05:48 AM    WBC 15.1 2018 09:42 AM    WBC 15.4 2009 06:00 AM    WBC 10.7 2009 11:00 AM    HGB 11.5 01/10/2018 05:48 AM    HGB 13.0 2018 09:42 AM    HGB 11.2 2009 06:00 AM    HGB 13.1 2009 11:00 AM    HCT 35.6 01/10/2018 05:48 AM    HCT 39.7 2018 09:42 AM    HCT 34.3 2009 06:00 AM    HCT 39.7 2009 11:00 AM    PLATELET 038  05:48 AM    PLATELET 570  09:42 AM    PLATELET 885  06:00 AM    PLATELET 512  11:00 AM       No results found for this or any previous visit (from the past 24 hour(s)).

## 2018-01-11 NOTE — ROUTINE PROCESS
Bedside shift change report given to Isaura Morris RN (oncoming nurse) by ALINA Wu RN (offgoing nurse). Report included the following information SBAR, Kardex, Procedure Summary, Intake/Output, MAR and Recent Results.

## 2018-01-11 NOTE — LACTATION NOTE
This note was copied from a baby's chart. Not seen at breast, mother declines [facility], expresses confidence in ability to breastfeed independently. Mother has been giving formula despite education about the risks to milk supply. She states that she is feeling better about breastfeeding today because she can feel that her breasts are heavier.

## 2018-01-12 VITALS
WEIGHT: 180 LBS | SYSTOLIC BLOOD PRESSURE: 97 MMHG | OXYGEN SATURATION: 98 % | RESPIRATION RATE: 16 BRPM | DIASTOLIC BLOOD PRESSURE: 60 MMHG | HEIGHT: 64 IN | BODY MASS INDEX: 30.73 KG/M2 | HEART RATE: 78 BPM | TEMPERATURE: 98 F

## 2018-01-12 PROCEDURE — 74011250637 HC RX REV CODE- 250/637: Performed by: OBSTETRICS & GYNECOLOGY

## 2018-01-12 RX ORDER — DOCUSATE SODIUM 100 MG/1
100 CAPSULE, LIQUID FILLED ORAL
Qty: 60 CAP | Refills: 1 | Status: SHIPPED | OUTPATIENT
Start: 2018-01-12 | End: 2019-05-29

## 2018-01-12 RX ORDER — IBUPROFEN 600 MG/1
600 TABLET ORAL
Qty: 40 TAB | Refills: 0 | Status: SHIPPED | OUTPATIENT
Start: 2018-01-12 | End: 2019-05-29

## 2018-01-12 RX ORDER — OXYCODONE AND ACETAMINOPHEN 5; 325 MG/1; MG/1
1 TABLET ORAL
Qty: 40 TAB | Refills: 0 | Status: SHIPPED | OUTPATIENT
Start: 2018-01-12 | End: 2019-05-29

## 2018-01-12 RX ADMIN — IBUPROFEN 800 MG: 400 TABLET, FILM COATED ORAL at 18:27

## 2018-01-12 RX ADMIN — IBUPROFEN 800 MG: 400 TABLET, FILM COATED ORAL at 10:42

## 2018-01-12 RX ADMIN — IBUPROFEN 800 MG: 400 TABLET, FILM COATED ORAL at 01:43

## 2018-01-12 NOTE — LACTATION NOTE
This note was copied from a baby's chart.     Couplet Interdisciplinary Rounds     MATERNAL    Daily Goal:     Influenza screening completed: YES   Tdap screening completed: YES   Rhogam Given:N/A  MMR Given:N/A    VTE Prophylaxis: Not indicated, per Provider order    EPDS:            Patient Name: Kavita Chowdhury Diagnosis: [place]  Single liveborn, born in hospital, delivered by  delivery   Date of Admission: 2018 LOS: 3  Gestational Age: Gestational Age: 38w3d       Daily Goal:     Birth Weight: 2.845 kg Current Weight: Weight: 2.515 kg (5 pounds 8.7 ounces)  % of Weight Change: -12%    Feeding:   Metabolic Screen: NO    Hepatitis B:  Patient refused    Discharge Bili:  YES  Car Seat Trial, if needed:  N/A      Patient/Family Teaching Needs:     Days before discharge: Ready for discharge    In Attendance:  Nursing

## 2018-01-12 NOTE — PROGRESS NOTES
.Bedside shift change report given to Naila Shah RN (oncoming nurse) by ALINA Baer RN (offgoing nurse). Report included the following information SBAR.

## 2018-01-12 NOTE — LACTATION NOTE
This note was copied from a baby's chart. Infant had an 11.5% weigh loss. Infant to be reweighed at 4:00pm today. Discharge pending weight. Per mom, baby nursing well today,  deep latch obtained, mother is comfortable, baby feeding vigorously with rhythmic suck, swallow, breathe pattern, audible swallowing, and evident milk transfer, both breasts offered, baby is asleep following feeding. Mom is double pumping for 15 minutes following breastfeeding the infant, then she is giving EBM and formula supplementation. Discussed engorgement management with mom. Opportunity for questions provided. Mom to call for assistance as needed.

## 2018-01-12 NOTE — PROGRESS NOTES
Post-Operative  Day 3    Alexandriaalexsander Mcgregor       Assessment: Post-Op day 3, doing well    Plan:   1. Discharge home today  2. Follow up in office in 6 weeks with Venkatesh Reyes MD  3. Post partum activity/wound care advised, diet as tolerated  4. Discharge Medications: ibuprofen, percocet and medications prior to admission      Information for the patient's :  Mary Bobby [[id number]]   , Low Transverse   Patient doing well without significant complaint. Tolerating diet, passing flatus, voiding and ambulating without difficulty    Vitals:  Visit Vitals    /80 (BP 1 Location: Right arm, BP Patient Position: At rest)    Pulse 75    Temp 97.7 °F (36.5 °C)    Resp 14    Ht 5' 4\" (1.626 m)    Wt 81.6 kg (180 lb)    SpO2 98%    Breastfeeding No    BMI 30.9 kg/m2     Temp (24hrs), Av °F (36.7 °C), Min:97.6 °F (36.4 °C), Max:98.5 °F (36.9 °C)        Exam:        Patient without distress. Abdomen, bowel sounds present, soft, expected tenderness, fundus firm                Wound incision clean, dry and intact               Lower extremities are negative for swelling, cords or tenderness. Labs:   Lab Results   Component Value Date/Time    WBC 17.7 01/10/2018 05:48 AM    WBC 15.1 2018 09:42 AM    WBC 15.4 2009 06:00 AM    WBC 10.7 2009 11:00 AM    HGB 11.5 01/10/2018 05:48 AM    HGB 13.0 2018 09:42 AM    HGB 11.2 2009 06:00 AM    HGB 13.1 2009 11:00 AM    HCT 35.6 01/10/2018 05:48 AM    HCT 39.7 2018 09:42 AM    HCT 34.3 2009 06:00 AM    HCT 39.7 2009 11:00 AM    PLATELET 005  05:48 AM    PLATELET 431  09:42 AM    PLATELET 696  06:00 AM    PLATELET 933  11:00 AM       No results found for this or any previous visit (from the past 24 hour(s)).

## 2018-01-12 NOTE — DISCHARGE SUMMARY
Obstetrical Discharge Summary     Name: Miri Nance MRN: [id number]  SSN: [id number]    YOB: 1975  Age: 44 y.o. Sex: female      Admit Date: 2018    Discharge Date: 2018     Admitting Physician: Dorian Madrid MD     Attending Physician:  Dorian Madrid MD     Admission Diagnoses: Maternity  Pregnancy  REPEAT   Vaginal bleeding    Discharge Diagnoses:   Information for the patient's :  Yu White [[id number]]   Delivery of a 2.845 kg female infant via , Low Transverse on 2018 at 2:28 PM  by . Apgars were 9 and 9. Additional Diagnoses:   Hospital Problems  Date Reviewed: 2018          Codes Class Noted POA    Vaginal bleeding in pregnancy, third trimester ICD-10-CM: O46.93  ICD-9-CM: 641.93  2018 Unknown        Pregnancy ICD-10-CM: Z34.90  ICD-9-CM: V22.2  2018 Unknown        Single delivery by  ICD-10-CM: O82  ICD-9-CM: 669.71  2018 Unknown             Lab Results   Component Value Date/Time    Rubella, External Immune 2017    GrBStrep, External Positive 2017       Hospital Course: Patient was admitted with vaginal bleeding and signs of early labor at 45 weeks 3 days. She was delivered by repeat  section and also underwent right ovarian cystectomy. Pathology pending at the time of discharge but operative note indicated a 3cm endometrioma. Normal hospital course following the delivery. Disposition at Discharge: Home or self care    Discharged Condition: Stable    Patient Instructions:   Current Discharge Medication List      START taking these medications    Details   ibuprofen (MOTRIN) 600 mg tablet Take 1 Tab by mouth every six (6) hours as needed for Pain. Qty: 40 Tab, Refills: 0      oxyCODONE-acetaminophen (PERCOCET) 5-325 mg per tablet Take 1 Tab by mouth every four (4) hours as needed for Pain. Max Daily Amount: 6 Tabs.  Indications: Pain  Qty: 40 Tab, Refills: 0    Associated Diagnoses: Single delivery by       docusate sodium (COLACE) 100 mg capsule Take 1 Cap by mouth two (2) times daily as needed for Constipation. Qty: 60 Cap, Refills: 1         CONTINUE these medications which have NOT CHANGED    Details   PRENATAL /IRON/FOLIC ACID (PRENATAL MULTI PO) Take  by mouth. Reference my discharge instructions.     Follow-up Appointments   Procedures    FOLLOW UP VISIT Appointment in: 6 Weeks     Standing Status:   Standing     Number of Occurrences:   1     Order Specific Question:   Appointment in     Answer:   6 Weeks        Signed By:  Luis Daniel Stanley MD     2018

## 2018-01-12 NOTE — DISCHARGE INSTRUCTIONS
Section: What to Expect at [address] [place] [address]    A  section, or , is surgery to deliver your baby through a cut, called an incision, that the doctor makes in your lower belly and uterus. You may have some pain in your lower belly and need pain medicine for 1 to 2 weeks. You can expect some vaginal bleeding for several weeks. You will probably need about 6 weeks to fully recover. It is important to take it easy while the incision is healing. Avoid heavy lifting, strenuous activities, or exercises that strain the belly muscles while you are recovering. Ask a family member or friend for help with housework, cooking, and shopping. This care sheet gives you a general idea about how long it will take for you to recover. But each person recovers at a different pace. Follow the steps below to get better as quickly as possible. How can you care for yourself at home? Activity  ? · Rest when you feel tired. Getting enough sleep will help you recover. ? · Try to walk each day. Start by walking a little more than you did the day before. Bit by bit, increase the amount you walk. Walking boosts blood flow and helps prevent pneumonia, constipation, and blood clots. ? · Avoid strenuous activities, such as bicycle riding, jogging, weightlifting, and aerobic exercise, for 6 weeks or until your doctor says it is okay. ? · Until your doctor says it is okay, do not lift anything heavier than your baby. ? · Do not do sit-ups or other exercises that strain the belly muscles for 6 weeks or until your doctor says it is okay. ? · Hold a pillow over your incision when you cough or take deep breaths. This will support your belly and decrease your pain. ? · You may shower as usual. Pat the incision dry when you are done. ? · You will have some vaginal bleeding. Wear sanitary pads. Do not douche or use tampons until your doctor says it is okay. ? · Ask your doctor when you can drive again. ? · You will probably need to take at least 6 weeks off work. It depends on the type of work you do and how you feel. ? · Ask your doctor when it is okay for you to have sex. Diet  ? · You can eat your normal diet. If your stomach is upset, try bland, low-fat foods like plain rice, broiled chicken, toast, and yogurt. ? · Drink plenty of fluids (unless your doctor tells you not to). ? · You may notice that your bowel movements are not regular right after your surgery. This is common. Try to avoid constipation and straining with bowel movements. You may want to take a fiber supplement every day. If you have not had a bowel movement after a couple of days, ask your doctor about taking a mild laxative. ? · If you are breastfeeding, do not drink any alcohol. Medicines  ? · Your doctor will tell you if and when you can restart your medicines. He or she will also give you instructions about taking any new medicines. ? · If you take blood thinners, such as warfarin (Coumadin), clopidogrel (Plavix), or aspirin, be sure to talk to your doctor. He or she will tell you if and when to start taking those medicines again. Make sure that you understand exactly what your doctor wants you to do. ? · Take pain medicines exactly as directed. ¨ If the doctor gave you a prescription medicine for pain, take it as prescribed. ¨ If you are not taking a prescription pain medicine, ask your doctor if you can take an over-the-counter medicine. ? · If you think your pain medicine is making you sick to your stomach:  ¨ Take your medicine after meals (unless your doctor has told you not to). ¨ Ask your doctor for a different pain medicine. ? · If your doctor prescribed antibiotics, take them as directed. Do not stop taking them just because you feel better. You need to take the full course of antibiotics. Incision care  ?  · If you have strips of tape on the incision, leave the tape on for a week or until it falls off.   ? · Wash the area daily with warm, soapy water, and pat it dry. Don't use hydrogen peroxide or alcohol, which can slow healing. You may cover the area with a gauze bandage if it weeps or rubs against clothing. Change the bandage every day. ? · Keep the area clean and dry. Other instructions  ? · If you breastfeed your baby, you may be more comfortable while you are healing if you place the baby so that he or she is not resting on your belly. Try tucking your baby under your arm, with his or her body along the side you will be feeding on. Support your baby's upper body with your arm. With that hand you can control your baby's head to bring his or her mouth to your breast. This is sometimes called the football hold. Follow-up care is a key part of your treatment and safety. Be sure to make and go to all appointments, and call your doctor if you are having problems. It's also a good idea to know your test results and keep a list of the medicines you take. When should you call for help? Call 911 anytime you think you may need emergency care. For example, call if:  ? · You passed out (lost consciousness). ? · You have chest pain, are short of breath, or cough up blood. ?Call your doctor now or seek immediate medical care if:  ? · You have pain that does not get better after you take pain medicine. ? · You have severe vaginal bleeding. ? · You are dizzy or lightheaded, or you feel like you may faint. ? · You have new or worse pain in your belly or pelvis. ? · You have loose stitches, or your incision comes open. ? · You have symptoms of infection, such as:  ¨ Increased pain, swelling, warmth, or redness. ¨ Red streaks leading from the incision. ¨ Pus draining from the incision. ¨ A fever. ? · You have symptoms of a blood clot in your leg (called a deep vein thrombosis), such as:  ¨ Pain in your calf, back of the knee, thigh, or groin. ¨ Redness and swelling in your leg or groin. ? Watch closely for changes in your health, and be sure to contact your doctor if:  ? · You do not get better as expected. Where can you learn more? Go to http://tani-patt.info/. Enter M806 in the search box to learn more about \" Section: What to Expect at Home. \"  Current as of: 2017  Content Version: 11.4  © 7015-4561 [employer]. Care instructions adapted under license by [employer] (which disclaims liability or warranty for this information). If you have questions about a medical condition or this instruction, always ask your healthcare professional. [place] [address] any warranty or liability for your use of this information.

## 2018-11-02 LAB
ANTIBODY SCREEN, EXTERNAL: NEGATIVE
HBSAG, EXTERNAL: NEGATIVE
HIV, EXTERNAL: NON REACTIVE
RUBELLA, EXTERNAL: NORMAL
T. PALLIDUM, EXTERNAL: NEGATIVE

## 2019-05-08 LAB — GRBS, EXTERNAL: NEGATIVE

## 2019-05-21 ENCOUNTER — ANESTHESIA EVENT (OUTPATIENT)
Dept: LABOR AND DELIVERY | Age: 44
End: 2019-05-21
Payer: COMMERCIAL

## 2019-05-24 ENCOUNTER — HOSPITAL ENCOUNTER (OUTPATIENT)
Dept: OTHER | Age: 44
Discharge: HOME | End: 2019-05-24
Payer: COMMERCIAL

## 2019-05-24 LAB
ABO + RH BLD: NORMAL
BLOOD GROUP ANTIBODIES SERPL: NORMAL
ERYTHROCYTE [DISTWIDTH] IN BLOOD BY AUTOMATED COUNT: 14.2 % (ref 11.5–14.5)
HCT VFR BLD AUTO: 39.7 % (ref 35–47)
HGB BLD-MCNC: 12.5 G/DL (ref 11.5–16)
MCH RBC QN AUTO: 28.4 PG (ref 26–34)
MCHC RBC AUTO-ENTMCNC: 31.5 G/DL (ref 30–36.5)
MCV RBC AUTO: 90.2 FL (ref 80–99)
NRBC # BLD: 0 K/UL (ref 0–0.01)
NRBC BLD-RTO: 0 PER 100 WBC
PLATELET # BLD AUTO: 225 K/UL (ref 150–400)
PMV BLD AUTO: 10.5 FL (ref 8.9–12.9)
RBC # BLD AUTO: 4.4 M/UL (ref 3.8–5.2)
SPECIMEN EXP DATE BLD: NORMAL
WBC # BLD AUTO: 9.9 K/UL (ref 3.6–11)

## 2019-05-24 PROCEDURE — 36415 COLL VENOUS BLD VENIPUNCTURE: CPT

## 2019-05-24 PROCEDURE — 85027 COMPLETE CBC AUTOMATED: CPT

## 2019-05-24 PROCEDURE — 86900 BLOOD TYPING SEROLOGIC ABO: CPT

## 2019-05-24 NOTE — PROGRESS NOTES
Patient here for Pre-Admission Testing (PAT) for scheduled  section. PAT packet reviewed with the patient. Labs drawn and sent. ARLEY wipes and preventing surgical site infection education given to the patient. Education also provided to be NPO after midnight and to arrive for scheduled procedure at 19 on 0800. Patient verbalizes understanding and sent home with PAT packet for further review. Patient instructed to take no medication(s) on the morning of her scheduled procedure.

## 2019-05-24 NOTE — H&P
Print      Patient  Name Esme Wilson (9ML, F) ID# [id number] Appt. Date/Time 05/15/2019 02:30PM    1975 Service Dept. [facility] [facility]   Provider Art Light MD   Insurance Med Primary: Daniel Saldivar # : [id number]  Policy/Group # : [id number]  Prescription: CMX - Member is eligible. details   Chief Complaint  ob visit  Patient's Care Team  OB/GYN: Jane Newman MD: [place], [place], [address] [place] [address], Ph [phone], Fax [phone] NPI: [id number]  Notes: No PCP  Patient's Pharmacies  [id number]): [address] [place] [address], [address] [place] [address] 1 [facility], Ph [phone], Fax [phone]  Allergies  Reviewed Allergies     NKDA   Medications  Reviewed Medications     PNV-DHA 27 mg iron-1 mg-300 mg capsule  TAKE ONE CAPSULE BY MOUTH EVERY DAY 10/17/18   renewed Kevyn Tobin MD   Vaccines  Vaccines not reviewed (last reviewed 2018)    Vaccine Type Date Amt. Route Site [address] Lot # Mfr. Exp.   Date Date  on VIS VIS  Given Vaccinator   Diphtheria, Tetanus, Pertussis   Tdap 03/15/19 0.5 mL Intramuscular Deltoid, Right  [id number] [employer] 06/01/21 02/24/15 03/15/19 Catalina Orantes                                                     Tdap 11/10/17 0.5 mL    [id number] [employer] 07/08/19 02/24/15  Wood LPN - Team 4 [facility]                                                     Influenza   influenza, seasonal, injectable, preservative free 18 0.5 mL Intramuscular Arm, Left Upper  [id number] [employer] 06/30/19 08/07/15 12/03/18 Yoli Hawk                                                     influenza, injectable, quadrivalent, preservative free 10/23/17 0.5 mL    [id number] [employer] 06/30/18 08/07/15  Kassy LPN - Team 3 [facility]                                                     Problems  Reviewed Problems     Endometriosis of ovary - Onset: 2018 - Entered By: Pierce Pritchett MDSigned By: Pierce Pritchett MD Description: Cyst endometrioma ovarian RIGHT code: 56.4   Infective/parasitic disease in preg/childbirth/puerperium - Onset: 12/22/2017 - Entered By: Jolene King MDSigned By: Jolene King MD Description: GBS positive RX in labor code: 750.50   Gestational diabetes mellitus - Prior GDM_declined glucola- fastings elevated, declined insulin 4/17, start glyburide 2.5mg *pt did not start meds, and BGs improved   Abnormality of organs AND/OR soft tissues of pelvis affecting pregnancy - Onset: 06/28/2017 - Entered By: Kolton Rodriguez MA - Team 4 SHPSigned By: Jolene King MD Description: Ovarian cyst pregnant 3cm, h/o endometrioma '09, f/u pp or at time of c/s code: 654.43   Fetal condition affecting obstetrical care of mother - Onset: 06/28/2017 - Entered By: Diana SCHULTE-BCSigned By: Diana SCHULTE-BC Description: Advanced paternal age code: 200.80    [facility] [place] Kacey Means 74, PAT 5/24 8am  Family History  Discussed Family History    Father - Diabetes mellitus   Social History  Discussed Social History  OB/GYN Social History  Smoking Status: Never smoker  Marital status:   Number of children: 2  Occupation: Homemaker  On average, how many days per week do you engage in moderate to strenuous EXERCISE (like walking fast, running, jogging, dancing, swimming, biking, or other activities that cause a light or heavy sweat)?: 3  How often do you have a DRINK containing ALCOHOL?: Never  Surgical History  Reviewed Surgical History     Caesarean Section   Caesarean Section   Other - excision of right ovarian edometrioma   Ovarian Cystectomy - 01/09/2018 - 3cm right ovarian cystectomy, performed at cesarian section  GYN History  GYN History not reviewed (last reviewed 12/03/2018)  Date of LMP: 08/30/2018. Date of Last Pap Smear: 11/10/2018 (Notes: NIL). Sexually Active?: Y.  STIs/STDs: N.  Current Birth Control Method: None. Obstetric History  Reviewed Obstetric History    TOTAL FULL PRE AB. I AB.  S ECTOPICS MULTIPLE LIVING   3       2   Past Pregnancies  Date # Fetuses GA Wks Labor Length Birth Weight Sex Delivery Type Outcome Anesthesia Delivery Place  Labor Notes Source   2009 1     F Caesarean section     primary section, breech,  historical   2018 1     F Caesarean section     RLTCS, 3cm right ovarian cystectomy historical   Pregnancy Problem List   Marginal insertion of umbilical cord - follow growth   Abnormal findings on  screening of mother - FOLLOW LFTS_ELV AT INTIAL screening- 1/10 -normal   Subclinical hyperthyroidism - TSH low, T4 wnl. RPT TSH 1.55 ()- normal   History of  section -  for breech/, repeat at [facility]   Paternal age - APA_age 50_growth @ 34w- wn   Advanced maternal age  - AMA>41 y/o; Fetal scan/echo with M_ genetics_ Monthy growth_, weekly testing>36w   Gestational diabetes mellitus - Prior GDM_declined glucola- fastings elevated, declined insulin , start glyburide 2.5mg *pt did not start meds, and BGs improved    Update JEREMY! KB pt.    RLTCS 19 at 10am Kacey Means 74, PAT  8am  Genetic Screening and Infection History  Patient's Age Will Be 28 Years Or Older At Estimated Date of Delivery: Y  2824: Thalassemia ([place], [place], Mediterranean, Or  Background): MCV < 80: Y  Neural Tube Defect (Meningomyelocele, Spina Bifida, Or Anencephaly): N  746: Congenital Heart Defect: N  7580: Down Syndrome: N  Duane-Sachs (eg, Jeana Breslow, Terry): N  Canavan Disease: N  282: Sickle Cell Disease Or Trait (): N  Hemophilia Or Other Blood Disorders: N  359: Muscular Dystrophy: N  2770: Cystic Fibrosis: N  3334: [place]'s Chorea: N  319: Mental Retardation/Autism: N  If Yes, Was Person Tested For Fragile X?: N  Other Inherited Genetic Or Chromosomal Disorder: N  Maternal Metabolic Disorder (eg, Type 1 Diabetes, PKU): N  Patient Or [de-identified] Father Had A Child With Birth Defects Not Listed Above: N  Recurrent Pregnancy Loss, Or A Stillbirth: N  Medications (including Supplements, Vitamins, Herbs, OTC Drugs), Illicit/Recreational Drugs, Alcohol: N  If Yes, Agent(s) And Strength/Dosage: N  Any Other Genetic History: N  Live With Someone With TB Or Exposed To TB: N  Patient Or Partner Has History Of Genital Herpes: N  Rash Or Viral Illness Since Last Menstrual Period: N  History Of STD, Gonorrhea, Chlamydia, HPV, Syphilis: N  Other Infection History: N  History of HIV: N  History of Hepatitis: N  Prior GBS-infected child: N  Prenatal Flowsheet  Fundus Pres FHR FM PLS Cervix Exam BP Wt Edema Glucose Protein Leukocytes Nitrite Ketones Blood   11/02/2018   9 wks 5 days   [id number]                             106/78 153 lbs          Comments: Dr Gisel Wallace pt. Unsure of [facility]. Pt reports daily nausea and vomiting. Pt denies swelling, vaginal bleeding, discharge, and headaches. Pt would like to discuss [id number]. Pt wishes to discuss possible medications for morning sickness. Prior GDM. Does not want glucola. Recent normal A1C. Pt will check BG and follow GDM diet. Start baby ASA due to risk factors. Offered genetic consult due to [place], APA. Pt to consider. Wants [id number].   11/02/2018   9 wks 5 days                           176       none neg       Comments: 11/02/2018 us today. urine culture sent. KR   12/03/2018   14 wks 1 days   [id number]                       16 cm  144    128/88 160 lbs none none neg       Comments: Subclinical Hyperthyroidism_ Repeat T4 1.55 on 11/2. Will repeat at 20wks Hx of previous section_will plan for repeat section. Advised to deliver at [facility] if desires Tubal, pt will consider. Non-compliant with low dose ASA. advised risk for Pre E,borderline bp 120/88, h/o GDM, AMA >39y/o and indications for baby ASA. Rec start daily ASA. Reports nausea but states it has improved. AFP only and thyroid at next visit. Discussed Dr.J mazariegos/griselda OB care in Feb. Flu vaccine today.    12/20/2018   16 wks 4 days [email or web address]                       17 wks  160 No   100/70 164 lbs none none neg       Comments: AFP only and TSH today. States her FBS is less than 90 when she checks. Declines Baby ASA. Still undecided about hospital. Gave brochure re: tubal ligation. Explained about [facility] being the only site for BTL.   01/10/2019     [email or web address]                                        01/10/2019   19 wks 4 days                         164.3 cm  153 Yes   122/74 164 lbs none none neg       Comments: US prior to visit;GIRL- marginal cord. Patient states she desires delivery at [facility]. Review BGs - fastings 90s, PP <120 (per pt). Check LFTs today. Patient denies any headaches, n/v, vaginal bleeding or abnormal discharge. 02/14/2019   24 wks 4 days                           160 Yes   110/72 168 lbs none none neg       Comments: US EFW 35%ile (marginal cord insert). BGs normal per pt (did not bring log - will check twice a day until 28w, then 4x/day from 28-30w and re-eval at that time). CS requested for 5/27. Patient denies any headaches, n/v, vaginal bleeding or abnormal discharge. 02/14/2019     [email or web address]                                        03/15/2019   28 wks 5 days   [email or web address]                         153 Yes   114/78 174 lbs none none neg       Comments: Patient not doing glucola today due to ho GDM. BG all wn. will start 4x/day for next 2 weeks. Patient desires Tdap. Patient denies any headaches, n/v, vaginal bleeding or abnormal discharge. 03/29/2019   30 wks 5 days   [email or web address]                         158 Yes   110/64 174 lbs none none trace       Comments: BGs PP normal. few fastings elevated (relates to timing of dinner). will do fasting daily, then one meal/day. Denies headaches, nausea, vomiting, abnormal discharge or vaginal bleeding. 04/17/2019   33 wks 3 days                          Transverse 144 Yes   116/72 176 lbs none none neg       Comments: EFW 51%ile. TRV. BPP 8/8.  more than half of fastings elevated- decilned insulin, start glyburide, then visit w M. Patient denies any headaches, n/v, vaginal bleeding or abnormal discharge. 04/17/2019   33 wks 3 days   [email or web address]                                        04/17/2019     [email or web address]                                        04/25/2019   34 wks 4 days                                          04/25/2019   34 wks 4 days   [email or web address]                        Transverse 133 Yes   112/76 175 lbs none none neg       Comments: BPP 8/8, murtaza 16.2 NST reactive. Blood sugars are improved with recent diet changes. Fasting 83- 90 and PP under . Did not start glyburide. Will continue to monitor and continue diet modifications. Cont weekly surv.   05/02/2019   35 wks 4 days   [email or web address]                          Yes   120/70 178 lbs none none neg       Comments: US prior- GDM - BGs now all normal w dietary changes only (did not start meds) GBS next visit. Patient denies any headaches, n/v, vaginal bleeding or abnormal discharge. 05/08/2019   36 wks 3 days   [email or web address]                        Transverse 140 Yes  0cm / 50% / -3 114/76 175 lbs none none neg       Comments: US after appt. GBS today. BGs all normal range. Patient denies any headaches, n/v, vaginal bleeding or abnormal discharge. 05/15/2019   37 wks 3 days                          Transverse 141 Yes   116/8 177 lbs none none trace       Comments: BPP 8/8. GBS Negative. BGs all wn range. CS consent signed today. H&P for this visit. Patient denies any headaches, n/v, vaginal bleeding or abnormal discharge. 05/15/2019     [email or web address]                                        OB Lab Results    Result Value Ref.  Range Date Collected Date Reviewed Reviewed By Note   Initial Labs             Blood Type O  11/02/2018 11/06/2018 [email or web address]        D (Rh) Type Positive  11/02/2018 11/06/2018 [email or web address]        Antibody Screen Negative NEGATIVE 11/02/2018 11/06/2018 [id number]        Antibody Screen Negative NEGATIVE 03/15/2019 03/18/2019 [email or web address]        HCT - Initial 39.1 % 34.0-46.6 11/02/2018 11/06/2018 [id number]        HGB - Initial 13.0 g/dL 11.1-15.9 11/02/2018 11/06/2018 ldovsedn75        MCV - Initial 85 fL 79-97 11/02/2018 11/06/2018 zsetrqsh50        PLT - Initial 388 x10E3/uL 150-379 11/02/2018 11/06/2018 nbburyes31        VDRL - Initial   11/02/2018 11/06/2018 dvvfptwc61        Urine Culture/Screen No growth  11/02/2018 11/05/2018 [id number]        HBsAg Negative NEGATIVE 11/02/2018 11/06/2018 [id number]        HIV Counseling/Testing Non Reactive NON REACTIVE 11/02/2018 11/06/2018 [id number]        Chlamydia - Initial Negative NEGATIVE 11/02/2018 11/06/2018 dzwqdldb79        Gonorrhea - Initial Negative NEGATIVE 11/02/2018 11/06/2018 fwvmaxpz04        Varicella             Rubella 9.62 index IMMUNE >0.99 11/02/2018 11/06/2018 ulalajdv23    Optional Labs             HGB Electrophoresis             PPD/Quanta             Pap Test             Cystic Fibrosis             HPV             Duane-Sachs             Familial Dysautonomia             Genetic Screening Tests             NST             TSH *0.073 uIU/mL 0.450-4.500 11/02/2018 11/06/2018 zraoqeyq08        TSH 0.822 uIU/mL 0.450-4.500 12/20/2018 12/27/2018 wdotson1        Drug screen             HCV Ab             HCV RNA             Urinalysis             Rhogam Injection         8-20 Week Labs             Ultrasound - Initial             1st Trimester Aneuploidy Risk Assessment             MSAFP/Multiple Markers Report  12/20/2018 12/27/2018 wdotson1        2nd Trimester Serum Screening Negative  12/20/2018 12/27/2018 wdotson1        Amnio/CVS             Karyotype             Amniotic Fluid (AFP)         24-28 Week Labs             HCT - 24-28 Weeks 36.8 % 34.0-46.6 03/15/2019 03/18/2019 [email or web address]        HGB - 24-28 Weeks 12.2 g/dL 11.1-15.9 03/15/2019 03/18/2019 [email or web address]        MCV - 24-28 Weeks             PLT - 24-28 Weeks 291 x10E3/uL 150-379 03/15/2019 03/18/2019 [email or web address] Diabetes Screen 5.4 % 4.8-5.6 11/02/2018 11/07/2018 cvkjnvob55        GTT (If Screen Abnormal)             D (Rh) Antibody Screen         32-36 Week Labs             HCT - 32-36 Weeks             HGB - 32-36 Weeks             MCV - 32-36 Weeks             PLT - 32-36 Weeks             Ultrasound - 32-36 Weeks             HIV (When Indicated)             VDRL - 32-36 Weeks   03/15/2019 03/18/2019 [email or web address]        Gonorrhea - 32-36 Weeks             Chlamydia - 32-36 Weeks             Depression screening (when indicated)         35-37 Week Labs             Group B Strep Negative NEGATIVE 05/08/2019 05/10/2019 [email or web address]        Resistance testing if penicillin allergic         Other Labs             Other - HBA1C (HEMOGLOBIN A1C), BLOOD 5.4 % 4.8-5.6 11/02/2018 11/07/2018 iivxczth25        Other - T4, FREE, SERUM   11/02/2018 12/03/2018 jkurrwll39        Other - T4, FREE, SERUM 1.55 ng/dL 0.82-1.77 11/02/2018 12/03/2018 [id number]    *Asterisk denotes an abnormal result         Past Medical History  Discussed Past Medical History  No significant past medical history: Y  Notes: neg. HPI  H&P for CS  pregnancy c/b AMA and GDM  ROS  Additionally reports: Except as noted in the HPI, the review of systems is negative for General, Breast, , Resp, GI, CV, Endo, MS, Psych and Heme. ROS as noted in the HPI  Physical Exam  Patient is a 71-year-old female. Constitutional: General Appearance: well developed and nourished and pleasant. Level of Distress: no acute distress. Ambulation: ambulating normally. Head: Head: normocephalic. Eyes: Extraocular Movements extraocular movements intact. Ears, Nose, Mouth, Throat: Ears normal hearing. Nose: no external nose lesions. Lungs / Chest: Respiratory effort: unlabored. Auscultation: no wheezing. Cardiovascular: Rate And Rhythm: regular. Heart Sounds: no murmurs. Neurologic: Gait and Station: normal gait. Sensation: grossly intact.  Motor: grossly intact. Mental Status Exam: Orientation oriented to person, place, and time. Assessment / Plan  1. Routine  care  Z34.03: Encounter for supervision of normal first pregnancy, third trimester    2. Gestation period, 37 weeks -  hx of prior CS x2, desires repeat, also baby has stayed in transverse position  - GDM - no meds, EFW 52%ile at 37weeks  Z3A.37: 37 weeks gestation of pregnancy      Return to Office   [address] 1 for Testing at [facility] [facility] on 2019 at 02:30 PM   [facility] Mess[facility] [facility] at 02:30 PM   [facility] [facility] on 2019 at 12:45 PM  Nohemi Singh MD for Surgery at [facility] on 2019 at 08:00 AM   for Return OB at [facility]_[place] [facility] on or around 2019   to see [address] [facility] for Office Visit at [address] on or around 2019  Encounter Sign-Off  Encounter signed-off by Daly Messina MD, 05/15/2019. Encounter performed and documented by Daly Messina MD   Encounter reviewed & signed by Daly Messina MD on 05/15/2019 at 3:02pm    There is not enough information to calculate an E&M code    Date of Surgery Update:  Anatoliy Cotter was seen and examined. History and physical has been reviewed. The patient has been examined.  There have been no significant clinical changes since the completion of the originally dated History and Physical.    Signed By: Kaleb Wallace MD     May 27, 2019 9:39 AM             Audit history

## 2019-05-27 ENCOUNTER — HOSPITAL ENCOUNTER (INPATIENT)
Age: 44
LOS: 2 days | Discharge: HOME | End: 2019-05-29
Attending: OBSTETRICS & GYNECOLOGY | Admitting: OBSTETRICS & GYNECOLOGY
Payer: COMMERCIAL

## 2019-05-27 ENCOUNTER — ANESTHESIA (OUTPATIENT)
Dept: LABOR AND DELIVERY | Age: 44
End: 2019-05-27
Payer: COMMERCIAL

## 2019-05-27 PROBLEM — Z34.90 TERM PREGNANCY: Status: ACTIVE | Noted: 2019-05-27

## 2019-05-27 PROCEDURE — 77030018836 HC SOL IRR NACL ICUM -A

## 2019-05-27 PROCEDURE — 77030007866 HC KT SPN ANES BBMI -B: Performed by: NURSE ANESTHETIST, CERTIFIED REGISTERED

## 2019-05-27 PROCEDURE — 65410000002 HC RM PRIVATE OB

## 2019-05-27 PROCEDURE — 74011250636 HC RX REV CODE- 250/636: Performed by: OBSTETRICS & GYNECOLOGY

## 2019-05-27 PROCEDURE — 77030031139 HC SUT VCRL2 J&J -A

## 2019-05-27 PROCEDURE — 76010000391 HC C SECN FIRST 1 HR: Performed by: OBSTETRICS & GYNECOLOGY

## 2019-05-27 PROCEDURE — 74011250636 HC RX REV CODE- 250/636

## 2019-05-27 PROCEDURE — 36415 COLL VENOUS BLD VENIPUNCTURE: CPT

## 2019-05-27 PROCEDURE — 77030018846 HC SOL IRR STRL H20 ICUM -A

## 2019-05-27 PROCEDURE — 75410000003 HC RECOV DEL/VAG/CSECN EA 0.5 HR: Performed by: OBSTETRICS & GYNECOLOGY

## 2019-05-27 PROCEDURE — 74011000250 HC RX REV CODE- 250

## 2019-05-27 PROCEDURE — 77030011220 HC DEV ELECSURG COVD -B

## 2019-05-27 PROCEDURE — 77030008459 HC STPLR SKN COOP -B

## 2019-05-27 PROCEDURE — 77030032490 HC SLV COMPR SCD KNE COVD -B

## 2019-05-27 PROCEDURE — 76060000078 HC EPIDURAL ANESTHESIA: Performed by: OBSTETRICS & GYNECOLOGY

## 2019-05-27 PROCEDURE — 74011250636 HC RX REV CODE- 250/636: Performed by: ANESTHESIOLOGY

## 2019-05-27 PROCEDURE — 77030011255 HC DSG AQUACEL AG BMS -A

## 2019-05-27 PROCEDURE — 76010000392 HC C SECN EA ADDL 0.5 HR: Performed by: OBSTETRICS & GYNECOLOGY

## 2019-05-27 PROCEDURE — 10907ZC DRAINAGE OF AMNIOTIC FLUID, THERAPEUTIC FROM PRODUCTS OF CONCEPTION, VIA NATURAL OR ARTIFICIAL OPENING: ICD-10-PCS | Performed by: OBSTETRICS & GYNECOLOGY

## 2019-05-27 RX ORDER — BUPIVACAINE HYDROCHLORIDE 7.5 MG/ML
INJECTION, SOLUTION EPIDURAL; RETROBULBAR AS NEEDED
Status: DISCONTINUED | OUTPATIENT
Start: 2019-05-27 | End: 2019-05-27 | Stop reason: HOSPADM

## 2019-05-27 RX ORDER — OXYTOCIN/RINGER'S LACTATE 20/1000 ML
125 PLASTIC BAG, INJECTION (ML) INTRAVENOUS AS NEEDED
Status: DISCONTINUED | OUTPATIENT
Start: 2019-05-27 | End: 2019-05-29 | Stop reason: HOSPADM

## 2019-05-27 RX ORDER — SIMETHICONE 80 MG
80 TABLET,CHEWABLE ORAL
Status: DISCONTINUED | OUTPATIENT
Start: 2019-05-27 | End: 2019-05-29 | Stop reason: HOSPADM

## 2019-05-27 RX ORDER — DOCUSATE SODIUM 100 MG/1
100 CAPSULE, LIQUID FILLED ORAL
Status: DISCONTINUED | OUTPATIENT
Start: 2019-05-27 | End: 2019-05-29 | Stop reason: HOSPADM

## 2019-05-27 RX ORDER — ONDANSETRON 2 MG/ML
4 INJECTION INTRAMUSCULAR; INTRAVENOUS
Status: DISCONTINUED | OUTPATIENT
Start: 2019-05-27 | End: 2019-05-29 | Stop reason: HOSPADM

## 2019-05-27 RX ORDER — SODIUM CHLORIDE 0.9 % (FLUSH) 0.9 %
5-40 SYRINGE (ML) INJECTION EVERY 8 HOURS
Status: DISCONTINUED | OUTPATIENT
Start: 2019-05-27 | End: 2019-05-29 | Stop reason: HOSPADM

## 2019-05-27 RX ORDER — SODIUM CHLORIDE, SODIUM LACTATE, POTASSIUM CHLORIDE, CALCIUM CHLORIDE 600; 310; 30; 20 MG/100ML; MG/100ML; MG/100ML; MG/100ML
INJECTION, SOLUTION INTRAVENOUS
Status: DISCONTINUED | OUTPATIENT
Start: 2019-05-27 | End: 2019-05-27 | Stop reason: HOSPADM

## 2019-05-27 RX ORDER — HYDROCORTISONE 1 %
CREAM (GRAM) TOPICAL AS NEEDED
Status: DISCONTINUED | OUTPATIENT
Start: 2019-05-27 | End: 2019-05-29 | Stop reason: HOSPADM

## 2019-05-27 RX ORDER — MORPHINE SULFATE 10 MG/ML
6 INJECTION, SOLUTION INTRAMUSCULAR; INTRAVENOUS
Status: ACTIVE | OUTPATIENT
Start: 2019-05-27 | End: 2019-05-28

## 2019-05-27 RX ORDER — DIPHENHYDRAMINE HCL 25 MG
25 CAPSULE ORAL
Status: DISCONTINUED | OUTPATIENT
Start: 2019-05-27 | End: 2019-05-29 | Stop reason: HOSPADM

## 2019-05-27 RX ORDER — ONDANSETRON 2 MG/ML
4 INJECTION INTRAMUSCULAR; INTRAVENOUS
Status: ACTIVE | OUTPATIENT
Start: 2019-05-27 | End: 2019-05-28

## 2019-05-27 RX ORDER — SODIUM CHLORIDE 0.9 % (FLUSH) 0.9 %
5-40 SYRINGE (ML) INJECTION AS NEEDED
Status: DISCONTINUED | OUTPATIENT
Start: 2019-05-27 | End: 2019-05-29 | Stop reason: HOSPADM

## 2019-05-27 RX ORDER — OXYTOCIN/RINGER'S LACTATE 20/1000 ML
125-1000 PLASTIC BAG, INJECTION (ML) INTRAVENOUS
Status: DISCONTINUED | OUTPATIENT
Start: 2019-05-27 | End: 2019-05-27 | Stop reason: HOSPADM

## 2019-05-27 RX ORDER — IBUPROFEN 400 MG/1
800 TABLET ORAL
Status: DISCONTINUED | OUTPATIENT
Start: 2019-05-27 | End: 2019-05-29 | Stop reason: HOSPADM

## 2019-05-27 RX ORDER — AMMONIA 15 % (W/V)
1 AMPUL (EA) INHALATION AS NEEDED
Status: DISCONTINUED | OUTPATIENT
Start: 2019-05-27 | End: 2019-05-29 | Stop reason: HOSPADM

## 2019-05-27 RX ORDER — OXYTOCIN/RINGER'S LACTATE 20/1000 ML
999 PLASTIC BAG, INJECTION (ML) INTRAVENOUS AS NEEDED
Status: DISCONTINUED | OUTPATIENT
Start: 2019-05-27 | End: 2019-05-29 | Stop reason: HOSPADM

## 2019-05-27 RX ORDER — SODIUM CHLORIDE, SODIUM LACTATE, POTASSIUM CHLORIDE, CALCIUM CHLORIDE 600; 310; 30; 20 MG/100ML; MG/100ML; MG/100ML; MG/100ML
125 INJECTION, SOLUTION INTRAVENOUS CONTINUOUS
Status: DISCONTINUED | OUTPATIENT
Start: 2019-05-27 | End: 2019-05-29 | Stop reason: HOSPADM

## 2019-05-27 RX ORDER — KETOROLAC TROMETHAMINE 30 MG/ML
30 INJECTION, SOLUTION INTRAMUSCULAR; INTRAVENOUS
Status: DISPENSED | OUTPATIENT
Start: 2019-05-27 | End: 2019-05-28

## 2019-05-27 RX ORDER — CEFAZOLIN SODIUM/WATER 2 G/20 ML
2 SYRINGE (ML) INTRAVENOUS ONCE
Status: COMPLETED | OUTPATIENT
Start: 2019-05-27 | End: 2019-05-27

## 2019-05-27 RX ORDER — MORPHINE SULFATE 10 MG/ML
10 INJECTION, SOLUTION INTRAMUSCULAR; INTRAVENOUS
Status: ACTIVE | OUTPATIENT
Start: 2019-05-27 | End: 2019-05-28

## 2019-05-27 RX ORDER — ONDANSETRON 2 MG/ML
INJECTION INTRAMUSCULAR; INTRAVENOUS AS NEEDED
Status: DISCONTINUED | OUTPATIENT
Start: 2019-05-27 | End: 2019-05-27 | Stop reason: HOSPADM

## 2019-05-27 RX ORDER — OXYCODONE AND ACETAMINOPHEN 5; 325 MG/1; MG/1
2 TABLET ORAL
Status: DISCONTINUED | OUTPATIENT
Start: 2019-05-27 | End: 2019-05-29 | Stop reason: HOSPADM

## 2019-05-27 RX ORDER — NALBUPHINE HYDROCHLORIDE 10 MG/ML
2.5 INJECTION, SOLUTION INTRAMUSCULAR; INTRAVENOUS; SUBCUTANEOUS
Status: ACTIVE | OUTPATIENT
Start: 2019-05-27 | End: 2019-05-28

## 2019-05-27 RX ORDER — OXYCODONE AND ACETAMINOPHEN 5; 325 MG/1; MG/1
1 TABLET ORAL
Status: DISCONTINUED | OUTPATIENT
Start: 2019-05-27 | End: 2019-05-29 | Stop reason: HOSPADM

## 2019-05-27 RX ORDER — OXYTOCIN/RINGER'S LACTATE 20/1000 ML
PLASTIC BAG, INJECTION (ML) INTRAVENOUS
Status: DISCONTINUED | OUTPATIENT
Start: 2019-05-27 | End: 2019-05-27 | Stop reason: HOSPADM

## 2019-05-27 RX ORDER — MORPHINE SULFATE 0.5 MG/ML
INJECTION, SOLUTION EPIDURAL; INTRATHECAL; INTRAVENOUS AS NEEDED
Status: DISCONTINUED | OUTPATIENT
Start: 2019-05-27 | End: 2019-05-27 | Stop reason: HOSPADM

## 2019-05-27 RX ADMIN — SODIUM CHLORIDE, SODIUM LACTATE, POTASSIUM CHLORIDE, AND CALCIUM CHLORIDE 1000 ML: 600; 310; 30; 20 INJECTION, SOLUTION INTRAVENOUS at 08:27

## 2019-05-27 RX ADMIN — BUPIVACAINE HYDROCHLORIDE 11 MG: 7.5 INJECTION, SOLUTION EPIDURAL; RETROBULBAR at 09:57

## 2019-05-27 RX ADMIN — KETOROLAC TROMETHAMINE 30 MG: 30 INJECTION, SOLUTION INTRAMUSCULAR at 11:51

## 2019-05-27 RX ADMIN — ONDANSETRON 4 MG: 2 INJECTION INTRAMUSCULAR; INTRAVENOUS at 09:59

## 2019-05-27 RX ADMIN — SODIUM CHLORIDE, SODIUM LACTATE, POTASSIUM CHLORIDE, AND CALCIUM CHLORIDE 125 ML/HR: 600; 310; 30; 20 INJECTION, SOLUTION INTRAVENOUS at 16:10

## 2019-05-27 RX ADMIN — SODIUM CHLORIDE, SODIUM LACTATE, POTASSIUM CHLORIDE, CALCIUM CHLORIDE: 600; 310; 30; 20 INJECTION, SOLUTION INTRAVENOUS at 09:44

## 2019-05-27 RX ADMIN — MORPHINE SULFATE 250 MCG: 0.5 INJECTION, SOLUTION EPIDURAL; INTRATHECAL; INTRAVENOUS at 09:57

## 2019-05-27 RX ADMIN — Medication 2 G: at 09:38

## 2019-05-27 RX ADMIN — Medication 999 ML/HR: at 10:21

## 2019-05-27 NOTE — OP NOTES
Operative Note    Name: Shayla Munoz   Medical Record Number: [id number]   YOB: 1975  Today's Date: May 27, 2019      Pre-operative Diagnosis: REPEAT C/S    Post-operative Diagnosis: same but delivered    Operation: low transverse  section Procedure(s):   SECTION    Surgeon(s):  Agustin Mendez MD    Anesthesia: Spinal    Prophylactic Antibiotics: Ancef  DVT Prophylaxis: Sequential Compression Devices         Fetal Description: don     Birth Information:   Information for the patient's :  Mahesh Arias [[id number]]   Delivery of a   female infant on 2019 at 10:20 AM. Apgars were   and  . Umbilical Cord:       Umbilical Cord Events:       Placenta: Expressed removal with Normal appearance. Amniotic Fluid Volume:        Amniotic Fluid Description:           Umbilical Cord: 3 vessels present    Placenta:  expressed    Estimated Blood Loss (ml):  500    Specimens: none    Implants: none           Complications:  none    Procedure Detail:      After proper patient identification and consent, the patient was taken to the operating room, where spinal anesthesia was administered and found to be adequate. Fang catheter had been placed using sterile technique. The patient was prepped and draped in the normal sterile fashion. The abdomen was entered using the Pfannenstiel technique. The peritoneum was entered well superior to the bladder without any apparent injury. The bladder flap was created without difficulty. A low transverse uterine incision was made with the scalpel and extended. Amniotomy was performed and the fluid was clear. The babys feet were then delivered atraumatically, with rest of body following quickly with standard breech maneuvers. The nose and mouth were suctioned. The cord was clamped and cut and the baby was handed off to Nursing staff in attendance. Placenta was then removed from the uterus.  The uterus was curettaged with a moist lap pad and cleared of all clots and debris. The uterine incision was closed with 0 Vicryl  in running locking fashion with good hemostasis assured. A second imbricating layer was placed. Several more sutures for complete hemostasis. Good hemostasis was again reassured and the uterus was returned to the abdomen. The pelvis was irrigated with warm normal saline and good hemostasis was again reassured throughout. The fascia was closed with 0 Vicryl in a running fashion. Good hemostasis was assured. The skin was closed with an  staple closure. The patient tolerated the procedure well. Sponge, lap, and needle counts were correct times three and the patient and baby were taken to recovery/postpartum room in stable condition.     Jeff Ireland MD  May 27, 2019  10:49 AM

## 2019-05-27 NOTE — ANESTHESIA PREPROCEDURE EVALUATION
Relevant Problems   No relevant active problems       Anesthetic History   No history of anesthetic complications            Review of Systems / Medical History  Patient summary reviewed, nursing notes reviewed and pertinent labs reviewed    Pulmonary  Within defined limits                 Neuro/Psych   Within defined limits           Cardiovascular  Within defined limits                Exercise tolerance: >4 METS     GI/Hepatic/Renal  Within defined limits              Endo/Other  Within defined limits           Other Findings   Comments: Repeat section           Physical Exam    Airway  Mallampati: II  TM Distance: > 6 cm  Neck ROM: normal range of motion   Mouth opening: Normal     Cardiovascular  Regular rate and rhythm,  S1 and S2 normal,  no murmur, click, rub, or gallop             Dental  No notable dental hx       Pulmonary  Breath sounds clear to auscultation               Abdominal  GI exam deferred       Other Findings            Anesthetic Plan    ASA: 2  Anesthesia type: spinal      Post-op pain plan if not by surgeon: intrathecal opiates    Induction: Intravenous  Anesthetic plan and risks discussed with: Patient

## 2019-05-27 NOTE — ANESTHESIA POSTPROCEDURE EVALUATION
Post-Anesthesia Evaluation and Assessment    Patient: Robin Montelongo MRN: [id number]  SSN: [id number]    YOB: 1975  Age: 38 y.o. Sex: female      I have evaluated the patient and they are stable and ready for discharge from the PACU. Cardiovascular Function/Vital Signs  Visit Vitals  /66   Pulse 64   Temp 36.4 °C (97.6 °F)   Resp 14   Ht 5' 5\" (1.651 m)   Wt 79.8 kg (176 lb)   SpO2 96%   Breastfeeding? No   BMI 29.29 kg/m²       Patient is status post Spinal anesthesia for Procedure(s) with comments:   SECTION - EDC:. Nausea/Vomiting: None    Postoperative hydration reviewed and adequate. Pain:  Pain Scale 1: Numeric (0 - 10) (19 1100)  Pain Intensity 1: 0 (19)   Managed    Neurological Status:   Neuro (WDL): Within Defined Limits (19)   At baseline    Mental Status, Level of Consciousness: Alert and  oriented to person, place, and time    Pulmonary Status:   O2 Device: Room air (19)   Adequate oxygenation and airway patent    Complications related to anesthesia: None    Post-anesthesia assessment completed. No concerns    Signed By: Jamee Calloway MD     May 27, 2019              Procedure(s):   SECTION.     spinal    <BSHSIANPOST>    Vitals Value Taken Time   /66 2019 11:44 AM   Temp 36.4 °C (97.6 °F) 2019 10:59 AM   Pulse 64 2019 11:44 AM   Resp 14 2019 10:59 AM   SpO2

## 2019-05-27 NOTE — PROGRESS NOTES
0812-patient arrived for scheduled 10am c section    1300-TRANSFER - OUT REPORT:    Verbal report given to ALINA Younger RN(name) on Robin Leavens  being transferred to MIU(unit) for routine progression of care       Report consisted of patients Situation, Background, Assessment and   Recommendations(SBAR). Information from the following report(s) SBAR, Intake/Output, MAR and Recent Results was reviewed with the receiving nurse. Lines:   Peripheral IV 05/27/19 Left;Posterior Hand (Active)   Site Assessment Clean, dry, & intact 5/27/2019  8:26 AM   Phlebitis Assessment 0 5/27/2019  8:26 AM   Infiltration Assessment 0 5/27/2019  8:26 AM   Dressing Status Clean, dry, & intact 5/27/2019  8:26 AM   Dressing Type Transparent;Tape 5/27/2019  8:26 AM   Hub Color/Line Status Pink; Infusing 5/27/2019  8:26 AM        Opportunity for questions and clarification was provided.       Patient transported with:   Registered Nurse

## 2019-05-27 NOTE — ANESTHESIA PROCEDURE NOTES
Spinal Block    Start time: 5/27/2019 9:53 AM  End time: 5/27/2019 9:56 AM  Performed by: Marissa Sibley MD  Authorized by: Marissa Sibley MD     Pre-procedure:   Indications: primary anesthetic  Preanesthetic Checklist: patient identified, risks and benefits discussed, anesthesia consent, site marked, patient being monitored and timeout performed    Timeout Time: 09:53          Spinal Block:   Patient Position:  Seated  Prep Region:  Lumbar  Prep: Betadine      Location:  L3-4  Technique:  Single shot        Needle:   Needle Type:  Pencan  Needle Gauge:  24 G  Attempts:  1      Events: CSF confirmed, no blood with aspiration and no paresthesia        Assessment:  Insertion:  Uncomplicated  Patient tolerance:  Patient tolerated the procedure well with no immediate complications

## 2019-05-28 LAB
BASOPHILS # BLD: 0.1 K/UL (ref 0–0.1)
BASOPHILS NFR BLD: 1 % (ref 0–1)
DIFFERENTIAL METHOD BLD: ABNORMAL
EOSINOPHIL # BLD: 0.3 K/UL (ref 0–0.4)
EOSINOPHIL NFR BLD: 2 % (ref 0–7)
ERYTHROCYTE [DISTWIDTH] IN BLOOD BY AUTOMATED COUNT: 14.3 % (ref 11.5–14.5)
HCT VFR BLD AUTO: 37.6 % (ref 35–47)
HGB BLD-MCNC: 11.9 G/DL (ref 11.5–16)
IMM GRANULOCYTES # BLD AUTO: 0.2 K/UL (ref 0–0.04)
IMM GRANULOCYTES NFR BLD AUTO: 1 % (ref 0–0.5)
LYMPHOCYTES # BLD: 2.3 K/UL (ref 0.8–3.5)
LYMPHOCYTES NFR BLD: 18 % (ref 12–49)
MCH RBC QN AUTO: 28.4 PG (ref 26–34)
MCHC RBC AUTO-ENTMCNC: 31.6 G/DL (ref 30–36.5)
MCV RBC AUTO: 89.7 FL (ref 80–99)
MONOCYTES # BLD: 0.8 K/UL (ref 0–1)
MONOCYTES NFR BLD: 6 % (ref 5–13)
NEUTS SEG # BLD: 9.5 K/UL (ref 1.8–8)
NEUTS SEG NFR BLD: 72 % (ref 32–75)
NRBC # BLD: 0 K/UL (ref 0–0.01)
NRBC BLD-RTO: 0 PER 100 WBC
PLATELET # BLD AUTO: 199 K/UL (ref 150–400)
PMV BLD AUTO: 10.6 FL (ref 8.9–12.9)
RBC # BLD AUTO: 4.19 M/UL (ref 3.8–5.2)
WBC # BLD AUTO: 13.2 K/UL (ref 3.6–11)

## 2019-05-28 PROCEDURE — 85025 COMPLETE CBC W/AUTO DIFF WBC: CPT

## 2019-05-28 PROCEDURE — 65410000002 HC RM PRIVATE OB

## 2019-05-28 PROCEDURE — 36415 COLL VENOUS BLD VENIPUNCTURE: CPT

## 2019-05-28 PROCEDURE — 74011250637 HC RX REV CODE- 250/637: Performed by: OBSTETRICS & GYNECOLOGY

## 2019-05-28 PROCEDURE — 74011250636 HC RX REV CODE- 250/636: Performed by: ANESTHESIOLOGY

## 2019-05-28 RX ADMIN — KETOROLAC TROMETHAMINE 30 MG: 30 INJECTION, SOLUTION INTRAMUSCULAR at 06:21

## 2019-05-28 RX ADMIN — IBUPROFEN 800 MG: 400 TABLET, FILM COATED ORAL at 19:39

## 2019-05-28 NOTE — LACTATION NOTE
This note was copied from a baby's chart. INitial Lactation Consultation - Baby born by  yesterday to a  mom at  44 1/7 weeks gestation. Mom said she had milk supply issues with her other 2 children and had to supplement with formula after delivery. Mom says this baby has been latching and nursing well. I set her up with the double, electric breast pump and recommended that she pump after nursing for extra breast stimulation. Feeding Plan: Mother will keep baby skin to skin as often as possible, feed on demand, respond to feeding cues, obtain latch, listen for audible swallowing, be aware of signs of oxytocin release/ cramping, thirst and sleepiness while breastfeeding.

## 2019-05-28 NOTE — ROUTINE PROCESS
1600:Bedside and Verbal shift change report given to COLIN Merrill (oncoming nurse) by Lety King (offgoing nurse). Report included the following information SBAR.

## 2019-05-28 NOTE — PROGRESS NOTES
Post-Operative  Day 1    Alexandria Mcgregor     Assessment: Post-Op day 1, stable    Plan:   1. Routine post-operative care   2. GDM --> plan for 2hr GTT at 6w check    Information for the patient's :  Sybil Heart [[id number]]   , Low Transverse   Patient doing well without significant complaint. Nausea and vomiting resolved, tolerating liquids, no flatus, caro in place. Vitals:  Visit Vitals  /73 (BP 1 Location: Right arm, BP Patient Position: At rest)   Pulse 72   Temp 98.3 °F (36.8 °C)   Resp 16   Ht 5' 5\" (1.651 m)   Wt 79.8 kg (176 lb)   SpO2 96%   Breastfeeding? Unknown   BMI 29.29 kg/m²     Temp (24hrs), Av.9 °F (36.6 °C), Min:97.5 °F (36.4 °C), Max:98.3 °F (36.8 °C)      Last 24hr Input/Output:    Intake/Output Summary (Last 24 hours) at 2019 1035  Last data filed at 2019 1930  Gross per 24 hour   Intake    Output 1260 ml   Net -1260 ml          Exam:        Patient without distress. Lungs clear. Abdomen, bowel sounds present, soft, expected tenderness, fundus firm Wound dressing intact     Perineum normal lochia noted               Lower extremities are negative for swelling, cords or tenderness.     Labs:   Lab Results   Component Value Date/Time    WBC 13.2 (H) 2019 05:03 AM    WBC 9.9 2019 09:09 AM    WBC 17.7 (H) 01/10/2018 05:48 AM    WBC 15.1 (H) 2018 09:42 AM    WBC 15.4 (H) 2009 06:00 AM    WBC 10.7 2009 11:00 AM    HGB 11.9 2019 05:03 AM    HGB 12.5 2019 09:09 AM    HGB 11.5 01/10/2018 05:48 AM    HGB 13.0 2018 09:42 AM    HGB 11.2 (L) 2009 06:00 AM    HGB 13.1 2009 11:00 AM    HCT 37.6 2019 05:03 AM    HCT 39.7 2019 09:09 AM    HCT 35.6 01/10/2018 05:48 AM    HCT 39.7 2018 09:42 AM    HCT 34.3 (L) 2009 06:00 AM    HCT 39.7 2009 11:00 AM    PLATELET 626  05:03 AM    PLATELET 451  09:09 AM    PLATELET 539  05:48 AM PLATELET 960 11/24/4662 09:42 AM    PLATELET 053 13/77/8473 06:00 AM    PLATELET 129 15/76/8381 11:00 AM       Recent Results (from the past 24 hour(s))   CBC WITH AUTOMATED DIFF    Collection Time: 05/28/19  5:03 AM   Result Value Ref Range    WBC 13.2 (H) 3.6 - 11.0 K/uL    RBC 4.19 3.80 - 5.20 M/uL    HGB 11.9 11.5 - 16.0 g/dL    HCT 37.6 35.0 - 47.0 %    MCV 89.7 80.0 - 99.0 FL    MCH 28.4 26.0 - 34.0 PG    MCHC 31.6 30.0 - 36.5 g/dL    RDW 14.3 11.5 - 14.5 %    PLATELET 877 220 - 346 K/uL    MPV 10.6 8.9 - 12.9 FL    NRBC 0.0 0  WBC    ABSOLUTE NRBC 0.00 0.00 - 0.01 K/uL    NEUTROPHILS 72 32 - 75 %    LYMPHOCYTES 18 12 - 49 %    MONOCYTES 6 5 - 13 %    EOSINOPHILS 2 0 - 7 %    BASOPHILS 1 0 - 1 %    IMMATURE GRANULOCYTES 1 (H) 0.0 - 0.5 %    ABS. NEUTROPHILS 9.5 (H) 1.8 - 8.0 K/UL    ABS. LYMPHOCYTES 2.3 0.8 - 3.5 K/UL    ABS. MONOCYTES 0.8 0.0 - 1.0 K/UL    ABS. EOSINOPHILS 0.3 0.0 - 0.4 K/UL    ABS. BASOPHILS 0.1 0.0 - 0.1 K/UL    ABS. IMM.  GRANS. 0.2 (H) 0.00 - 0.04 K/UL    DF AUTOMATED

## 2019-05-28 NOTE — ROUTINE PROCESS
Bedside shift change report given to Katelynn Sommer RN (oncoming nurse) by SOHAN Sandy RN (offgoing nurse). Report included the following information SBAR, Kardex, Procedure Summary, Intake/Output, MAR and Recent Results.

## 2019-05-28 NOTE — PROGRESS NOTES
Anesthesia Post Op Duramorph Note. Patient is s/p spinal or epidural and DuraMorph for Cesearean Section. Pain, itching, and nausea and/or vomiting respectively are:  none, none, none. All symptoms were treated with protocol meds with good results. Patient is up and ambulating without complaints. Site looks ok. Plan: Continue protocols as needed.

## 2019-05-28 NOTE — ROUTINE PROCESS
Bedside shift change report given to [address] [place] [address] (oncoming nurse) by ALINA Lynn RN (offgoing nurse). Report included the following information SBAR, Procedure Summary, Intake/Output, MAR and Recent Results.

## 2019-05-28 NOTE — ROUTINE PROCESS
1300- TRANSFER - IN REPORT: 
 
Verbal report received from NOLBERTO De León RN (name) on Griffin Byrd  being received from L&D (unit) for routine progression of care Report consisted of patients Situation, Background, Assessment and  
Recommendations(SBAR). Information from the following report(s) SBAR, Kardex, OR Summary, Procedure Summary, Intake/Output, MAR and Recent Results was reviewed with the receiving nurse. Opportunity for questions and clarification was provided. Assessment completed upon patients arrival to unit and care assumed. 1930- Fang removed and pt assisted to the bathroom. Egress test done. [facility]icare taught and pt verbalized understanding. DTV by 0130.

## 2019-05-29 VITALS
DIASTOLIC BLOOD PRESSURE: 78 MMHG | HEART RATE: 65 BPM | HEIGHT: 65 IN | WEIGHT: 176 LBS | OXYGEN SATURATION: 96 % | RESPIRATION RATE: 16 BRPM | SYSTOLIC BLOOD PRESSURE: 124 MMHG | TEMPERATURE: 97.9 F | BODY MASS INDEX: 29.32 KG/M2

## 2019-05-29 PROCEDURE — 74011250637 HC RX REV CODE- 250/637: Performed by: OBSTETRICS & GYNECOLOGY

## 2019-05-29 RX ORDER — DOCUSATE SODIUM 100 MG/1
100 CAPSULE, LIQUID FILLED ORAL
Qty: 60 CAP | Refills: 1 | Status: SHIPPED | OUTPATIENT
Start: 2019-05-29

## 2019-05-29 RX ORDER — IBUPROFEN 600 MG/1
600 TABLET ORAL
Qty: 40 TAB | Refills: 0 | Status: SHIPPED | OUTPATIENT
Start: 2019-05-29

## 2019-05-29 RX ADMIN — IBUPROFEN 800 MG: 400 TABLET, FILM COATED ORAL at 04:48

## 2019-05-29 RX ADMIN — IBUPROFEN 800 MG: 400 TABLET, FILM COATED ORAL at 15:23

## 2019-05-29 RX ADMIN — DOCUSATE SODIUM 100 MG: 100 CAPSULE, LIQUID FILLED ORAL at 11:43

## 2019-05-29 NOTE — ROUTINE PROCESS
Bedside shift change report given to Becka Mancuso RN (oncoming nurse) by Stefan Rios. Freida Payne RN (offgoing nurse). Report included the following information SBAR, Kardex, Procedure Summary, Intake/Output, MAR and Recent Results.

## 2019-05-29 NOTE — ROUTINE PROCESS
Bedside shift change report given to Crystal Negro RN (oncoming nurse) by Lou Foss RN (offgoing nurse). Report included the following information SBAR.  
 
 
3064: spoke to mom and dad about the details and procedures needed for discharge, mom said she would like to be discharged tomorrow, discharge lbs to be done in the early morning and will let OB and Ped know

## 2019-05-29 NOTE — LACTATION NOTE
This note was copied from a baby's chart. Mother reports Baby nursing well today,  deep latch obtained, mother is comfortable, baby feeding vigorously with rhythmic suck, swallow, breathe pattern, audible swallowing, and evident milk transfer, both breasts offered, baby is asleep following feeding. She has been giving formula for perceived low supply, history of low supply, and personal preference. She has not pumped consistently but says her milk is coming in. She has a hand pump at home and plans to obtain an electric pump through her insurance. Not seen at breast, mother declines [facility] consult, expresses confidence in ability to breastfeed independently. Mother states that she has no further questions for Lactation Consultant before discharge.

## 2019-05-29 NOTE — DISCHARGE SUMMARY
Obstetrical Discharge Summary     Name: Robin Montelongo MRN: [id number]  SSN: [id number]    YOB: 1975  Age: 38 y.o. Sex: female      Admit Date: 2019    Discharge Date: 2019     Admitting Physician: Trev Perez MD     Attending Physician:  Terrence Mccormack MD     Admission Diagnoses: Term pregnancy [Z34.80]    Discharge Diagnoses:   Information for the patient's :  Nancy Shah [[id number]]   Delivery of a 3.17 kg female infant via , Low Transverse on 2019 at 10:20 AM  by . Apgars were 9 and 9. Additional Diagnoses:   Hospital Problems  Date Reviewed: 2018          Codes Class Noted POA    Term pregnancy ICD-10-CM: Z34.80  ICD-9-CM: V22.1  2019 Unknown             Lab Results   Component Value Date/Time    Rubella, External immune 2018    GrBStrep, External negative 2019       Hospital Course: Normal hospital course following the delivery. Disposition at Discharge: Home or self care    Discharged Condition: Stable    Patient Instructions:   Current Discharge Medication List      CONTINUE these medications which have CHANGED    Details   docusate sodium (COLACE) 100 mg capsule Take 1 Cap by mouth two (2) times daily as needed for Constipation. Qty: 60 Cap, Refills: 1      ibuprofen (MOTRIN) 600 mg tablet Take 1 Tab by mouth every six (6) hours as needed for Pain. Qty: 40 Tab, Refills: 0         CONTINUE these medications which have NOT CHANGED    Details   PRENATAL /IRON/FOLIC ACID (PRENATAL MULTI PO) Take  by mouth. STOP taking these medications       oxyCODONE-acetaminophen (PERCOCET) 5-325 mg per tablet Comments:   Reason for Stopping:               Reference my discharge instructions.     Follow-up Appointments   Procedures    FOLLOW UP VISIT Appointment in: 6 Weeks     Standing Status:   Standing     Number of Occurrences:   1     Order Specific Question:   Appointment in     Answer:   6 Weeks Signed By:  Fratun Armas MD     May 29, 2019

## 2019-05-29 NOTE — PROGRESS NOTES
Post-Operative  Day 2    Alexandria Mcgregor     Assessment: Post-Op day 2, doing well    Plan:   1. Routine post-operative care  2. GDM: plan 2hr gtt at postpartum visit  3. Discharge home today per patient request    Information for the patient's :  Nancy Shah [[id number]]   , Low Transverse    Subjective:  Patient doing well without significant complaint. Nausea and vomiting resolved, tolerating liquids, passing flatus, voiding and ambulating without difficulty. Wants to go home today. Vitals:  Visit Vitals  /78 (BP 1 Location: Left arm, BP Patient Position: At rest)   Pulse 65   Temp 97.9 °F (36.6 °C)   Resp 16   Ht 5' 5\" (1.651 m)   Wt 79.8 kg (176 lb)   SpO2 96%   Breastfeeding? Unknown   BMI 29.29 kg/m²     Temp (24hrs), Av.1 °F (36.7 °C), Min:97.7 °F (36.5 °C), Max:98.6 °F (37 °C)        Exam:      Patient without distress.                Abdomen, bowel sounds present, soft, expected tenderness, fundus firm                Wound incision clean, dry and intact               Lower extremities: tr LE edema, no calf pain  Labs:   Lab Results   Component Value Date/Time    WBC 13.2 (H) 2019 05:03 AM    WBC 9.9 2019 09:09 AM    WBC 17.7 (H) 01/10/2018 05:48 AM    WBC 15.1 (H) 2018 09:42 AM    WBC 15.4 (H) 2009 06:00 AM    WBC 10.7 2009 11:00 AM    HGB 11.9 2019 05:03 AM    HGB 12.5 2019 09:09 AM    HGB 11.5 01/10/2018 05:48 AM    HGB 13.0 2018 09:42 AM    HGB 11.2 (L) 2009 06:00 AM    HGB 13.1 2009 11:00 AM    HCT 37.6 2019 05:03 AM    HCT 39.7 2019 09:09 AM    HCT 35.6 01/10/2018 05:48 AM    HCT 39.7 2018 09:42 AM    HCT 34.3 (L) 2009 06:00 AM    HCT 39.7 2009 11:00 AM    PLATELET 274  05:03 AM    PLATELET 986 84/15/1732 09:09 AM    PLATELET 653  05:48 AM    PLATELET 131  09:42 AM    PLATELET 195  06:00 AM    PLATELET 342  11:00 AM       No results found for this or any previous visit (from the past 24 hour(s)).

## 2019-05-29 NOTE — DISCHARGE INSTRUCTIONS
POSTPARTUM DISCHARGE INSTRUCTIONS       Name:  Hung Garcia  YOB: 1975  Admission Diagnosis:  Term pregnancy [Z34.80]     Discharge Diagnosis:    Problem List as of 2019 Date Reviewed: 2018          Codes Class Noted - Resolved    Term pregnancy ICD-10-CM: Z34.80  ICD-9-CM: V22.1  2019 - Present        Vaginal bleeding in pregnancy, third trimester ICD-10-CM: O46.93  ICD-9-CM: 641.93  2018 - Present        Pregnancy ICD-10-CM: Z34.90  ICD-9-CM: V22.2  2018 - Present        Single delivery by  ICD-10-CM: O82  ICD-9-CM: 669.71  2018 - Present            Attending Physician:  Jazmine Morgan MD    Delivery Type:   Section: What to Expect at Home    Your Recovery:  A  section, or , is surgery to deliver your baby through a cut, called an incision that the doctor makes in your lower belly and uterus. You may have some pain in your lower belly and need pain medicine for 1 to 2 weeks. You can expect some vaginal bleeding for several weeks. You will probably need about 6 weeks to fully recover. It is important to take it easy while the incision is healing. Avoid heavy lifting, strenuous activities, or exercises that strain the belly muscles while you are recovering. Ask a family member or friend for help with housework, cooking, and shopping. This care sheet gives you a general idea about how long it will take for you to recover. But each person recovers at a different pace. Follow the steps below to get better as quickly as possible. How can you care for yourself at home? Activity  · Rest when you feel tired. Getting enough sleep will help you recover. · Try to walk each day. Start by walking a little more than you did the day before. Bit by bit, increase the amount you walk. Walking boosts blood flow and helps prevent pneumonia, constipation, and blood clots.   · Avoid strenuous activities, such as bicycle riding, jogging, weightlifting, and aerobic exercise, for 6 weeks or until your doctor says it is okay. · Until your doctor says it is okay, do not lift anything heavier than your baby. · Do not do sit-ups or other exercise that strain the belly muscles for 6 weeks or until your doctor says it is okay. · Hold a pillow over your incision when you cough or take deep breaths. This will support your belly and decrease your pain. · You may shower as usual. Pat the incision dry when you are done. · You will have some vaginal bleeding. Wear sanitary pads. Do not douche or use tampons until your doctor says it is okay. · Ask your doctor when you can drive again. · You will probably need to take at least 6 weeks off work. It depends on the type of work you do and how you feel. · Wait until you are healed (about 4 to 6 weeks) before you have sexual intercourse. Your doctor will tell you when it is okay to have sex. · Talk to your doctor about birth control. You can get pregnant even before your period returns. Also, you can get pregnant while you are breast-feeding. Incision care  Your skin is your body's first line of defense against germs, but an incision site leaves an easy way for germs to enter your body. To prevent infection:  · Clean your hands frequently and before and after changing any touching any dressings. · If you have strips of tape on the incision, leave the tape on for a week or until it falls off. · Look at your incision closely every day for any changes. Contact your doctor if you experience any signs of infection, such as fever or increased redness at the surgical site. · Wash the area daily with warm, soapy water, and pat it dry. Don't use hydrogen peroxide or alcohol, which can slow healing. You may cover the area with a gauze bandage if it weeps or rubs against clothing. Change the bandage every day. · Keep the area clean and dry. Diet  · You can eat your normal diet.  If your stomach is upset, try bland, low-fat foods like plain rice, broiled chicken, toast, and yogurt. · Drink plenty of fluids (unless your doctor tells you not to). · You may notice that your bowel movements are not regular right after your surgery. This is common. Try to avoid constipation and straining with bowel movements. You may want to take a fiber supplement every day. If you have not had a bowel movement after a couple of days, ask your doctor about taking a mild laxative. · If you are breast-feeding, do not drink any alcohol. Medicines  · Take pain medicines exactly as directed. · If the doctor gave you a prescription medicine for pain, take it as prescribed. · If you are not taking a prescription pain medicine, ask your doctor if you can take an over-the-counter medicine such as acetaminophen (Tylenol), ibuprofen (Advil, Motrin), or naproxen (Aleve), for cramps. Read and follow all instructions on the label. Do not take aspirin, because it can cause more bleeding. Do not take acetaminophen (Tylenol) and other acetaminophen containing medications (i.e. Percocet) at the same time. · If you think your pain medicine is making you sick to your stomach:  · Take your medicine after meals (unless your doctor has told you not to). · Ask your doctor for a different pain medicine. · If your doctor prescribed antibiotics, take them as directed. Do not stop taking them just because you feel better. You need to take the full course of antibiotics. Mental Health  · Many women get the \"baby blues\" during the first few days after childbirth. You may lose sleep, feel irritable, and cry easily. You may feel happy one minute and sad the next. Hormone changes are one cause of these emotional changes. Also, the demands of a new baby, along with visits from relatives or other family needs, add to a mother's stress. The \"baby blues\" often peak around the fourth day. Then they ease up in less than 2 weeks.   · If your moodiness or anxiety lasts for more than 2 weeks, or if you feel like life is not worth living, you may have postpartum depression. This is different for each mother. Some mothers with serious depression may worry intensely about their infant's well-being. Others may feel distant from their child. Some mothers might even feel that they might harm their baby. A mother may have signs of paranoia, wondering if someone is watching her. · With all the changes in your life, you may not know if you are depressed. Pregnancy sometimes causes changes in how you feel that are similar to the symptoms of depression. · Symptoms of depression include:  · Feeling sad or hopeless and losing interest in daily activities. These are the most common symptoms of depression. · Sleeping too much or not enough. · Feeling tired. You may feel as if you have no energy. · Eating too much or too little. · [employer] ([employer]I) offers a Warm line; Chat with the Expert phone sessions; Information and Articles about Pregnancy and Postpartum Mood Disorders; Comprehensive List of Free Support Groups; Knowledgeable local coordinators who will offer support, information, and resources; Guide to Resources on [employer]; Calendar of events in the  mood disorders community; Latest News and Research; and [facility] [address] for [employer]. Remember - You are not alone; You are not to blame; With help, you will be well. 1-869-943-PPD(2116). WWW. POSTPARTUM. NET   · Writing or talking about death, such as writing suicide notes or talking about guns, knives, or pills. Keep the numbers for these national suicide hotlines: 7-476-777-TALK (6-940.765.5801) and 7-864-DPXPITX (3-466.138.6126). If you or someone you know talks about suicide or feeling hopeless, get help right away.     Other instructions  · If you breast-feed your baby, you may be more comfortable while you are healing if you place the baby so that he or she is not resting on your belly. Try tucking your baby under your arm, with his or her body along the side you will be feeding on. Support your baby's upper body with your arm. With that hand you can control your baby's head to bring his or her mouth to your breast. This is sometimes called the football hold. Follow-up care is a key part of your treatment and safety. Be sure to make and go to all appointments, and call your doctor if you are having problems. It's also a good idea to know your test results and keep a list of the medicines you take. When should you call for help? Call 911 anytime you think you may need emergency care. For example, call if:  · You are thinking of hurting yourself, your baby, or anyone else. · You passed out (lost consciousness). · You have symptoms of a blood clot in your lung (called a pulmonary embolism). These may include:  · Sudden chest pain. · Trouble breathing. · Coughing up blood. Call your doctor now or seek immediate medical care if:    · You have severe vaginal bleeding. · You are soaking through a pad each hour for 2 or more hours. · Your vaginal bleeding seems to be getting heavier or is still bright red 4 days after delivery. · You are dizzy or lightheaded, or you feel like you may faint. · You are vomiting or cannot keep fluids down. · You have a fever. · You have new or more belly pain. · You have loose stitches, or your incision comes open. · You have symptoms of infection, such as:  · Increased pain, swelling, warmth, or redness. · Red streaks leading from the incision. · Pus draining from the incision. · A fever  · You pass tissue (not just blood). · Your vaginal discharge smells bad. · Your belly feels tender or full and hard. · Your breasts are continuously painful or red. · You feel sad, anxious, or hopeless for more than a few days. · You have sudden, severe pain in your belly.   · You have symptoms of a blood clot in your leg (called a deep vein thrombosis), such as:  · Pain in your calf, back of the knee, thigh, or groin. · Redness and swelling in your leg or groin. · You have symptoms of preeclampsia, such as:  · Sudden swelling of your face, hands, or feet. · New vision problems (such as dimness or blurring). · A severe headache. · Your blood pressure is higher than it should be or rises suddenly. · You have new nausea or vomiting. Watch closely for changes in your health, and be sure to contact your doctor if you have any problems. Additional Information:  Gestational Diabetes After Childbirth     Most of the time, gestational diabetes goes away after a baby is born. But if you have had gestational diabetes, you have a greater chance of having it in a future pregnancy and of developing type 2 diabetes. To check for diabetes, you may have a follow-up glucose tolerance test 6 to 12 weeks after your baby is born or after you stop breast-feeding your baby. You may be able to control your blood sugar with a healthy diet and regular exercise. Staying at a healthy weight also may keep you from getting type 2 diabetes later on. If diet and exercise do not lower your blood sugar enough, you may need to take insulin shots. Insulin is safe during pregnancy. These are general instructions for a healthy lifestyle:    No smoking/ No tobacco products/ Avoid exposure to second hand smoke    Surgeon General's Warning:  Quitting smoking now greatly reduces serious risk to your health.     Obesity, smoking, and sedentary lifestyle greatly increases your risk for illness    A healthy diet, regular physical exercise & weight monitoring are important for maintaining a healthy lifestyle    Recognize signs and symptoms of STROKE:    F-face looks uneven    A-arms unable to move or move unevenly    S-speech slurred or non-existent    T-time-call 911 as soon as signs and symptoms begin - DO NOT go       back to bed or wait to see if you get better - TIME IS BRAIN.      I have had the opportunity to make my options or choices for discharge. I have received and understand these instructions. Breastfeeding Support Group  [employer] [facility] meets the 1st and 3rd Tuesday of each month in the [facility] from 10:00-11:00, (located behind [employer] on the first floor) Meetings are facilitated by board certified lactation consultants and all breastfeeding moms and their infants are invited.

## 2021-01-01 NOTE — ANESTHESIA PROCEDURE NOTES
Spinal Block    Start time: 1/9/2018 2:00 PM  End time: 1/9/2018 2:06 PM  Performed by: Gal Alejandre  Authorized by: Gal Alejandre     Pre-procedure:   Indications: primary anesthetic  Preanesthetic Checklist: patient identified, risks and benefits discussed, anesthesia consent, site marked, patient being monitored and timeout performed    Timeout Time: 14:00          Spinal Block:   Patient Position:  Seated  Prep Region:  Lumbar  Prep: Betadine and patient draped      Location:  L3-4  Technique:  Single shot  Local:  Lidocaine 1%      Needle:   Needle Type:  Pencil-tip  Needle Gauge:  22 G  Attempts:  2      Events: CSF confirmed, no blood with aspiration and no paresthesia        Assessment:  Insertion:  Uncomplicated  Patient tolerance:  Patient tolerated the procedure well with no immediate complications
No

## (undated) DEVICE — DRAPE FLD WRM W44XL66IN C6L FOR INTRATEMP SYS THERMABASIN

## (undated) DEVICE — PACK PROCEDURE SURG C SECT KT SMH

## (undated) DEVICE — LIGHT HANDLE: Brand: DEVON

## (undated) DEVICE — DEVON™ KNEE AND BODY STRAP 60" X 3" (1.5 M X 7.6 CM): Brand: DEVON

## (undated) DEVICE — COVERALL PREM SMS 2XL KNIT --

## (undated) DEVICE — STERILE POLYISOPRENE POWDER-FREE SURGICAL GLOVES: Brand: PROTEXIS

## (undated) DEVICE — REM POLYHESIVE ADULT PATIENT RETURN ELECTRODE: Brand: VALLEYLAB

## (undated) DEVICE — SUTURE VCRL SZ 0 L36IN ABSRB UD L40MM CT 1/2 CIR TAPERPOINT J958H

## (undated) DEVICE — KENDALL SCD EXPRESS SLEEVES, KNEE LENGTH, MEDIUM: Brand: KENDALL SCD

## (undated) DEVICE — (D)PREP SKN CHLRAPRP APPL 26ML -- CONVERT TO ITEM 371833

## (undated) DEVICE — SOLUTION IRRIG 1000ML H2O STRL BLT

## (undated) DEVICE — CATH FOLEY 16F LUBRI-SIL IC --

## (undated) DEVICE — STERILE POLYISOPRENE POWDER-FREE SURGICAL GLOVES WITH EMOLLIENT COATING: Brand: PROTEXIS

## (undated) DEVICE — SUTURE VCRL SZ 2-0 L36IN ABSRB VLT L36MM CT-1 1/2 CIR J345H

## (undated) DEVICE — 3M™ TEGADERM™ TRANSPARENT FILM DRESSING FRAME STYLE, 1628, 6 IN X 8 IN (15 CM X 20 CM), 10/CT 8CT/CASE: Brand: 3M™ TEGADERM™

## (undated) DEVICE — ROYAL SILK SURGICAL GOWN, XXL: Brand: CONVERTORS

## (undated) DEVICE — SUTURE PDS II SZ 0 L60IN ABSRB VLT L48MM CTX 1/2 CIR Z990G

## (undated) DEVICE — SOLUTION IV 1000ML 0.9% SOD CHL

## (undated) DEVICE — 3000CC GUARDIAN II: Brand: GUARDIAN

## (undated) DEVICE — Z DISCONTINUED PER MEDLINE DRESSING ALG W9XL10CM SIL CVR WTRPRF VIR BACT BARR ANTIMIC

## (undated) DEVICE — SOLIDIFIER MEDC 1200ML -- CONVERT TO 356117

## (undated) DEVICE — 1200 GUARD II KIT W/5MM TUBE W/O VAC TUBE: Brand: GUARDIAN

## (undated) DEVICE — POOLE SUCTION INSTRUMENT WITH REMOVABLE SHEATH: Brand: POOLE

## (undated) DEVICE — BARRIER TISS ADH ABSRB 3X4IN -- GYNECARE INTERCEED

## (undated) DEVICE — ROYALSILK SURGICAL GOWN, L: Brand: CONVERTORS

## (undated) DEVICE — SUTURE MCRYL SZ 4-0 L27IN ABSRB UD L24MM PS-1 3/8 CIR PRIM Y935H

## (undated) DEVICE — ANESTHESIA TRAY SPNL W/O NDL PENCAN

## (undated) DEVICE — ELECTROSURGICAL DEVICE HOLSTER;FOR USE WITH MAXIMUM PEAK VOLTAGE OF 4000 V: Brand: FORCE TRIVERSE

## (undated) DEVICE — SUTURE MCRYL SZ 1 L36IN ABSRB VLT CT-1 L36MM 1/2 CIR TAPR Y347H

## (undated) DEVICE — DRESSING AQUACEL ADVANTAGE ALG W4XL5IN RECT GREATER ABSRB HYDRFBR TECHNOLOGY

## (undated) DEVICE — SUTURE VCRL SZ 0 L36IN ABSRB VLT L40MM CT 1/2 CIR J358H

## (undated) DEVICE — SPONGE: LAP 18X18 W  200/CS: Brand: MEDICAL ACTION INDUSTRIES

## (undated) DEVICE — MEDI-VAC NON-CONDUCTIVE SUCTION TUBING: Brand: CARDINAL HEALTH

## (undated) DEVICE — STAPLER SKIN SQ 30 ABSRB STPL DISP INSORB